# Patient Record
Sex: FEMALE | Race: WHITE | Employment: PART TIME | URBAN - METROPOLITAN AREA
[De-identification: names, ages, dates, MRNs, and addresses within clinical notes are randomized per-mention and may not be internally consistent; named-entity substitution may affect disease eponyms.]

---

## 2017-01-25 ENCOUNTER — OFFICE VISIT (OUTPATIENT)
Dept: FAMILY MEDICINE CLINIC | Facility: CLINIC | Age: 57
End: 2017-01-25

## 2017-01-25 VITALS
WEIGHT: 244 LBS | HEART RATE: 83 BPM | DIASTOLIC BLOOD PRESSURE: 74 MMHG | HEIGHT: 67 IN | RESPIRATION RATE: 16 BRPM | TEMPERATURE: 98 F | SYSTOLIC BLOOD PRESSURE: 132 MMHG | BODY MASS INDEX: 38.3 KG/M2 | OXYGEN SATURATION: 98 %

## 2017-01-25 DIAGNOSIS — Z01.419 WELL WOMAN EXAM WITH ROUTINE GYNECOLOGICAL EXAM: Primary | ICD-10-CM

## 2017-01-25 DIAGNOSIS — Z12.4 SCREENING FOR MALIGNANT NEOPLASM OF CERVIX: ICD-10-CM

## 2017-01-25 DIAGNOSIS — Z12.39 SCREENING FOR BREAST CANCER: ICD-10-CM

## 2017-01-25 DIAGNOSIS — Z12.11 SCREENING FOR COLON CANCER: ICD-10-CM

## 2017-01-25 DIAGNOSIS — I10 ESSENTIAL HYPERTENSION: ICD-10-CM

## 2017-01-25 DIAGNOSIS — E55.9 VITAMIN D DEFICIENCY: ICD-10-CM

## 2017-01-25 DIAGNOSIS — E78.2 MIXED HYPERLIPIDEMIA: ICD-10-CM

## 2017-01-25 PROCEDURE — 88175 CYTOPATH C/V AUTO FLUID REDO: CPT | Performed by: PHYSICIAN ASSISTANT

## 2017-01-25 PROCEDURE — 99396 PREV VISIT EST AGE 40-64: CPT | Performed by: PHYSICIAN ASSISTANT

## 2017-01-25 PROCEDURE — 87624 HPV HI-RISK TYP POOLED RSLT: CPT | Performed by: PHYSICIAN ASSISTANT

## 2017-01-25 NOTE — PROGRESS NOTES
HPI:   Jake Gao is a 61year old female who presents for a well woman exam. Symptoms: denies discharge, itching, burning or dysuria, is menopausal. Has form to be completed for work. No LMP recorded.  Patient is postmenopausal.  Previous pap: pt unsu Use: Yes           1.5 oz/week       3 Standard drinks or equivalent per week       Comment: social    Occ: [employer] . : yes. Children: yes. Exercise: 4 times per week.   Diet: watches calories closely     REVIEW OF SYSTEMS:   GENE for a well woman exam.  Pap and pelvic done. Discussed the benefits of routine exercise, a heart healthy diet, adequate dietary calcium, and annual flu vaccines.   Reinforced monthly self breast self-exams with annual mammograms and screening colonoscopy st

## 2017-01-27 LAB — HPV I/H RISK 1 DNA SPEC QL NAA+PROBE: NEGATIVE

## 2017-05-15 ENCOUNTER — OFFICE VISIT (OUTPATIENT)
Dept: FAMILY MEDICINE CLINIC | Facility: CLINIC | Age: 57
End: 2017-05-15

## 2017-05-15 VITALS
HEART RATE: 89 BPM | WEIGHT: 244 LBS | OXYGEN SATURATION: 98 % | DIASTOLIC BLOOD PRESSURE: 80 MMHG | HEIGHT: 67 IN | BODY MASS INDEX: 38.3 KG/M2 | SYSTOLIC BLOOD PRESSURE: 124 MMHG | RESPIRATION RATE: 18 BRPM | TEMPERATURE: 100 F

## 2017-05-15 DIAGNOSIS — J02.0 STREP THROAT: Primary | ICD-10-CM

## 2017-05-15 DIAGNOSIS — J02.9 SORE THROAT: ICD-10-CM

## 2017-05-15 PROCEDURE — 87880 STREP A ASSAY W/OPTIC: CPT | Performed by: NURSE PRACTITIONER

## 2017-05-15 PROCEDURE — 99213 OFFICE O/P EST LOW 20 MIN: CPT | Performed by: NURSE PRACTITIONER

## 2017-05-15 RX ORDER — AMOXICILLIN 500 MG/1
500 TABLET, FILM COATED ORAL 2 TIMES DAILY
Qty: 20 TABLET | Refills: 0 | Status: SHIPPED | OUTPATIENT
Start: 2017-05-15 | End: 2017-05-25

## 2017-05-15 NOTE — PROGRESS NOTES
CHIEF COMPLAINT:   Patient presents with:  Sore Throat: fever x 1 day       HPI:   Francisco Montgomery is a 61year old female presents to clinic with symptoms of sore throat. Patient has had for 1 days. Symptoms have been worsneing since onset.   Patient reports NOSE: nostrils patent, no exudates, nasal mucosa pink and noninflamed  THROAT: oral mucosa pink, moist. Posterior pharynx erythematous and injected. pos exudates. Tonsils 2/4. Breath pos malodorous. No uvular deviation. No drooling.   NECK: supple  LUNGS: You have had a positive test for strep throat. Strep throat is a contagious illness. It is spread by coughing, kissing or by touching others after touching your mouth or nose.  Symptoms include throat pain which is worse with swallowing, aching all over, he · Inability to swallow liquids, excessive drooling, or inability to open mouth wide due to throat pain  · Signs of dehydration (very dark urine or no urine, sunken eyes, dizziness)  · Trouble breathing or noisy breathing  · Muffled voice  · New rash  Date

## 2017-06-10 ENCOUNTER — TELEPHONE (OUTPATIENT)
Dept: FAMILY MEDICINE CLINIC | Facility: CLINIC | Age: 57
End: 2017-06-10

## 2017-06-10 DIAGNOSIS — I10 ESSENTIAL HYPERTENSION WITH GOAL BLOOD PRESSURE LESS THAN 140/90: Primary | ICD-10-CM

## 2017-06-12 RX ORDER — LISINOPRIL AND HYDROCHLOROTHIAZIDE 12.5; 1 MG/1; MG/1
TABLET ORAL
Qty: 90 TABLET | Refills: 2 | Status: SHIPPED | OUTPATIENT
Start: 2017-06-12 | End: 2017-11-15

## 2017-07-19 ENCOUNTER — NURSE ONLY (OUTPATIENT)
Dept: FAMILY MEDICINE CLINIC | Facility: CLINIC | Age: 57
End: 2017-07-19

## 2017-07-19 VITALS
WEIGHT: 244 LBS | HEIGHT: 69 IN | TEMPERATURE: 98 F | OXYGEN SATURATION: 98 % | SYSTOLIC BLOOD PRESSURE: 120 MMHG | DIASTOLIC BLOOD PRESSURE: 70 MMHG | RESPIRATION RATE: 20 BRPM | HEART RATE: 90 BPM | BODY MASS INDEX: 36.14 KG/M2

## 2017-07-19 DIAGNOSIS — K13.79 MOUTH SORE: Primary | ICD-10-CM

## 2017-07-19 PROCEDURE — 99212 OFFICE O/P EST SF 10 MIN: CPT | Performed by: NURSE PRACTITIONER

## 2017-07-19 NOTE — PROGRESS NOTES
CHIEF COMPLAINT:   Patient presents with:  Redness: back of upper mouth near throat s/s for 1 day      HPI:   Julián Franco is a 61year old female presents to clinic with symptoms of slight throat irritation  Reports yesterday thought she felt a bit of a s non-injected, no bulging, retraction, or fluid  NOSE: nostrils patent, no exudates, nasal mucosa pink and noninflamed  THROAT: oral mucosa pink, moist. Edge of soft palate area with few erythematous spots. No ulcerations, no swelling.   No gum erythema or

## 2017-11-14 ENCOUNTER — APPOINTMENT (OUTPATIENT)
Dept: LAB | Age: 57
End: 2017-11-14
Attending: PHYSICIAN ASSISTANT
Payer: COMMERCIAL

## 2017-11-14 DIAGNOSIS — E55.9 VITAMIN D DEFICIENCY: ICD-10-CM

## 2017-11-14 DIAGNOSIS — E78.2 MIXED HYPERLIPIDEMIA: ICD-10-CM

## 2017-11-14 DIAGNOSIS — I10 ESSENTIAL HYPERTENSION: ICD-10-CM

## 2017-11-14 PROCEDURE — 80061 LIPID PANEL: CPT | Performed by: PHYSICIAN ASSISTANT

## 2017-11-14 PROCEDURE — 80053 COMPREHEN METABOLIC PANEL: CPT | Performed by: PHYSICIAN ASSISTANT

## 2017-11-14 PROCEDURE — 82306 VITAMIN D 25 HYDROXY: CPT | Performed by: PHYSICIAN ASSISTANT

## 2017-11-14 PROCEDURE — 36415 COLL VENOUS BLD VENIPUNCTURE: CPT | Performed by: PHYSICIAN ASSISTANT

## 2017-11-15 ENCOUNTER — OFFICE VISIT (OUTPATIENT)
Dept: FAMILY MEDICINE CLINIC | Facility: CLINIC | Age: 57
End: 2017-11-15

## 2017-11-15 VITALS
TEMPERATURE: 98 F | BODY MASS INDEX: 37.46 KG/M2 | WEIGHT: 250 LBS | HEIGHT: 68.5 IN | HEART RATE: 83 BPM | OXYGEN SATURATION: 98 % | SYSTOLIC BLOOD PRESSURE: 138 MMHG | DIASTOLIC BLOOD PRESSURE: 82 MMHG | RESPIRATION RATE: 20 BRPM

## 2017-11-15 DIAGNOSIS — E66.9 OBESITY (BMI 35.0-39.9 WITHOUT COMORBIDITY): ICD-10-CM

## 2017-11-15 DIAGNOSIS — I10 ESSENTIAL HYPERTENSION WITH GOAL BLOOD PRESSURE LESS THAN 140/90: Primary | ICD-10-CM

## 2017-11-15 DIAGNOSIS — E55.9 VITAMIN D DEFICIENCY: ICD-10-CM

## 2017-11-15 DIAGNOSIS — E78.2 MIXED HYPERLIPIDEMIA: ICD-10-CM

## 2017-11-15 PROCEDURE — 99214 OFFICE O/P EST MOD 30 MIN: CPT | Performed by: PHYSICIAN ASSISTANT

## 2017-11-15 RX ORDER — LISINOPRIL AND HYDROCHLOROTHIAZIDE 12.5; 1 MG/1; MG/1
TABLET ORAL
Qty: 90 TABLET | Refills: 3 | Status: SHIPPED | OUTPATIENT
Start: 2017-11-15 | End: 2018-11-21

## 2017-11-15 NOTE — PATIENT INSTRUCTIONS
Vitamin D3 4000 units daily. Fish oil 2000 mg daily.    [facility] [phone]

## 2017-11-15 NOTE — PROGRESS NOTES
HPI:    Patient ID: Julián Franco is a 61year old female. HPI  Pt presents to clinic to follow up on blood pressure. Takes lisinopril-HCTZ daily for control. Pt took her medication just prior to her visit this morning.    Checking BP at home: 110/80 duri salt and heart healthy diet  Continue home monitoring  Regular aerobic exercise  CMP ordered. - COMP METABOLIC PANEL (14); Future  - Lisinopril-Hydrochlorothiazide 10-12.5 MG Oral Tab; TAKE ONE TABLET BY MOUTH ONCE DAILY  Dispense: 90 tablet;  Refill: 3

## 2017-12-24 ENCOUNTER — OFFICE VISIT (OUTPATIENT)
Dept: FAMILY MEDICINE CLINIC | Facility: CLINIC | Age: 57
End: 2017-12-24

## 2017-12-24 VITALS
OXYGEN SATURATION: 98 % | DIASTOLIC BLOOD PRESSURE: 80 MMHG | HEART RATE: 86 BPM | HEIGHT: 68 IN | WEIGHT: 225 LBS | BODY MASS INDEX: 34.1 KG/M2 | SYSTOLIC BLOOD PRESSURE: 122 MMHG | TEMPERATURE: 98 F | RESPIRATION RATE: 16 BRPM

## 2017-12-24 DIAGNOSIS — N30.01 ACUTE CYSTITIS WITH HEMATURIA: Primary | ICD-10-CM

## 2017-12-24 DIAGNOSIS — R35.0 FREQUENCY OF URINATION: ICD-10-CM

## 2017-12-24 PROCEDURE — 87086 URINE CULTURE/COLONY COUNT: CPT | Performed by: NURSE PRACTITIONER

## 2017-12-24 PROCEDURE — 81003 URINALYSIS AUTO W/O SCOPE: CPT | Performed by: NURSE PRACTITIONER

## 2017-12-24 PROCEDURE — 99213 OFFICE O/P EST LOW 20 MIN: CPT | Performed by: NURSE PRACTITIONER

## 2017-12-24 RX ORDER — NITROFURANTOIN 25; 75 MG/1; MG/1
100 CAPSULE ORAL 2 TIMES DAILY
Qty: 10 CAPSULE | Refills: 0 | Status: SHIPPED | OUTPATIENT
Start: 2017-12-24 | End: 2017-12-29

## 2017-12-24 NOTE — PROGRESS NOTES
Unknown  is a 61year old female. HPI:   Patient presents with symptoms of UTI for 1 days. Complaining of urinary frequency, urgency,   Denies back pain, fever, hematuria.   Pt has no history of UTI in past.  Pt denies any new sexual partner in the p GI: normoactive BS x4, no masses, HSM; no suprapubic tenderness, no CVAT    RESULTS:        Recent Results (from the past 24 hour(s))  -URINALYSIS, AUTO, W/O SCOPE   Collection Time: 12/24/17  9:29 AM   Result Value Ref Range   GLUCOSE (URINE DIPSTICK) neg The phrases \"bladder infection,\" \"UTI,\" and \"cystitis\" are often used to describe the same thing. But they are not always the same. Cystitis is an inflammation of the bladder. The most common cause of cystitis is an infection.   Symptoms  The infectio · Take antibiotics until they are used up, even if you feel better. It is important to finish them to make sure the infection has cleared. · You can use acetaminophen or ibuprofen for pain, fever, or discomfort, unless another medicine was prescribed.  If If X-rays were done, you will be told if the results will affect your treatment.   Call 911  Call 911 if any of the following occur:  · Trouble breathing  · Hard to wake up or confusion  · Fainting or loss of consciousness  · Rapid heart rate  When to seek

## 2017-12-26 NOTE — PROGRESS NOTES
Called patient with negative results. Pt is feeling better and will continue monitoring sx. Will return to clinic if sx increase or persist.   Pt voiced understanding.

## 2018-05-09 ENCOUNTER — OFFICE VISIT (OUTPATIENT)
Dept: FAMILY MEDICINE CLINIC | Facility: CLINIC | Age: 58
End: 2018-05-09

## 2018-05-09 VITALS
OXYGEN SATURATION: 98 % | RESPIRATION RATE: 16 BRPM | SYSTOLIC BLOOD PRESSURE: 132 MMHG | BODY MASS INDEX: 32.58 KG/M2 | HEIGHT: 68 IN | WEIGHT: 215 LBS | DIASTOLIC BLOOD PRESSURE: 74 MMHG | HEART RATE: 78 BPM | TEMPERATURE: 98 F

## 2018-05-09 DIAGNOSIS — J02.9 SORE THROAT: Primary | ICD-10-CM

## 2018-05-09 DIAGNOSIS — J02.9 ACUTE VIRAL PHARYNGITIS: ICD-10-CM

## 2018-05-09 PROCEDURE — 99213 OFFICE O/P EST LOW 20 MIN: CPT | Performed by: NURSE PRACTITIONER

## 2018-05-09 PROCEDURE — 87880 STREP A ASSAY W/OPTIC: CPT | Performed by: NURSE PRACTITIONER

## 2018-05-09 NOTE — PATIENT INSTRUCTIONS
When You Have a Sore Throat    A sore throat can be painful. There are many reasons why you may have a sore throat. Your healthcare provider will work with you to find the cause of your sore throat. He or she will also find the best treatment for you.   Frederick Herrera During the exam, your healthcare provider checks your ears, nose, and throat for problems.  He or she also checks for swelling in the neck, and may listen to your chest.  Possible tests  Other tests your healthcare provider may perform include:  · A throat If your sore throat is due to a bacterial infection, antibiotics may speed healing and prevent complications.  Although group A streptococcus (\"strep throat\" or GAS) is the major treatable infection for a sore throat, GAS causes only 5% to 15% of sore thr © 3581-4426 [employer] [address] [facility], [address] [place]. All rights reserved. This information is not intended as a substitute for professional medical care. Always follow your healthcare professional's instructions.         Self-Ca · Limit contact with pets and with allergy-causing substances, such as pollen and mold. · When you’re around someone with a sore throat or cold, wash your hands often to keep viruses or bacteria from spreading. · Don’t strain your vocal cords.   Call your

## 2018-05-09 NOTE — PROGRESS NOTES
CHIEF COMPLAINT:   Patient presents with:  Sore Throat: sore throat x3 days    HPI:   Kalpana Santana is a 61year old female who presents to clinic with symptoms of sore throat. Patient has had for 3 days. Symptoms have been better since onset.   Patient repo /74   Pulse 78   Temp 97.9 °F (36.6 °C) (Oral)   Resp 16   Ht 68\"   Wt 215 lb   SpO2 98%   BMI 32.69 kg/m²   GENERAL: well developed, well nourished,in no apparent distress  SKIN: no rashes,no suspicious lesions  HEAD: atraumatic, normocephalic  EYE OTC Tylenol/Motrin prn. Push fluids- warm or cool liquids, whichever is soothing for patient  Warm salt water gargles 2 times per day for at least 3 days. Do not share utensils or drinks with anyone.     The patient indicates understanding of these iss · Do you have any other symptoms, such as body aches, fever, or cough? · Does your sore throat recur? If so, how often? How many days of school or work have you missed because of a sore throat? · Do you have trouble eating or swallowing?   · Have you been If your sore throat is due to a bacterial infection, antibiotics may speed healing and prevent complications.  Although group A streptococcus (\"strep throat\" or GAS) is the major treatable infection for a sore throat, GAS causes only 5% to 15% of sore thr © 9000-8020 [address]. [address] [facility], [address] [place]. All rights reserved. This information is not intended as a substitute for professional medical care. Always follow your healthcare professional's instructions.         Self-Ca · Limit contact with pets and with allergy-causing substances, such as pollen and mold. · When you’re around someone with a sore throat or cold, wash your hands often to keep viruses or bacteria from spreading. · Don’t strain your vocal cords.   Call your

## 2018-09-11 ENCOUNTER — OFFICE VISIT (OUTPATIENT)
Dept: FAMILY MEDICINE CLINIC | Facility: CLINIC | Age: 58
End: 2018-09-11
Payer: COMMERCIAL

## 2018-09-11 VITALS
HEART RATE: 83 BPM | BODY MASS INDEX: 32.13 KG/M2 | OXYGEN SATURATION: 98 % | DIASTOLIC BLOOD PRESSURE: 80 MMHG | WEIGHT: 212 LBS | SYSTOLIC BLOOD PRESSURE: 122 MMHG | HEIGHT: 68 IN | RESPIRATION RATE: 16 BRPM | TEMPERATURE: 99 F

## 2018-09-11 DIAGNOSIS — S80.211A ABRASION OF RIGHT KNEE, INITIAL ENCOUNTER: Primary | ICD-10-CM

## 2018-09-11 PROCEDURE — 99212 OFFICE O/P EST SF 10 MIN: CPT | Performed by: NURSE PRACTITIONER

## 2018-09-11 NOTE — PROGRESS NOTES
Patient presents with:  Skin: right knee scraped sx x 4 days. HPI:   Wilberto Colvin is a 60year old female presents with concerns of local infection after scraping her right knee on sand/boardwarlk 4 days ago.   There is no erythema, warmth or tenderne Location: Right arm, Patient Position: Sitting, Cuff Size: adult)   Pulse 83   Temp 98.5 °F (36.9 °C) (Oral)   Resp 16   Ht 68\"   Wt 212 lb   SpO2 98%   BMI 32.23 kg/m²   GENERAL: well developed, well nourished,in no apparent distress  SKIN: right knee wi

## 2018-09-11 NOTE — PATIENT INSTRUCTIONS
Abrasions  Abrasions are skin scrapes. Their treatment depends on how large and deep the abrasion is. Home care  You may be prescribed an antibiotic cream or ointment to apply to the wound. This helps prevent infection.  Follow instructions when using th · Increasing pain, redness, swelling, or drainage from the wound  · Bleeding from the wound that does not stop after a few minutes of steady, firm pressure  · Decreased ability to move any body part near the wound  Date Last Reviewed: 3/3/2017  © 9234-2688

## 2018-10-04 DIAGNOSIS — Z12.31 ENCOUNTER FOR SCREENING MAMMOGRAM FOR MALIGNANT NEOPLASM OF BREAST: Primary | ICD-10-CM

## 2018-11-09 ENCOUNTER — LAB SERVICES (OUTPATIENT)
Dept: OTHER | Age: 58
End: 2018-11-09

## 2018-11-09 ENCOUNTER — CHARTING TRANS (OUTPATIENT)
Dept: OTHER | Age: 58
End: 2018-11-09

## 2018-11-09 LAB — RAPID STREP GROUP A: NORMAL

## 2018-11-11 ENCOUNTER — LAB SERVICES (OUTPATIENT)
Dept: OTHER | Age: 58
End: 2018-11-11

## 2018-11-11 ENCOUNTER — CHARTING TRANS (OUTPATIENT)
Dept: OTHER | Age: 58
End: 2018-11-11

## 2018-11-11 LAB — RAPID STREP GROUP A: NORMAL

## 2018-11-11 ASSESSMENT — PAIN SCALES - GENERAL: PAINLEVEL_OUTOF10: 4

## 2018-11-21 DIAGNOSIS — I10 ESSENTIAL HYPERTENSION WITH GOAL BLOOD PRESSURE LESS THAN 140/90: ICD-10-CM

## 2018-11-21 RX ORDER — LISINOPRIL AND HYDROCHLOROTHIAZIDE 12.5; 1 MG/1; MG/1
TABLET ORAL
Qty: 30 TABLET | Refills: 0 | Status: SHIPPED | OUTPATIENT
Start: 2018-11-21 | End: 2018-12-19

## 2018-11-21 RX ORDER — LISINOPRIL AND HYDROCHLOROTHIAZIDE 12.5; 1 MG/1; MG/1
TABLET ORAL
Qty: 90 TABLET | Refills: 0 | OUTPATIENT
Start: 2018-11-21

## 2018-11-23 DIAGNOSIS — I10 ESSENTIAL HYPERTENSION WITH GOAL BLOOD PRESSURE LESS THAN 140/90: ICD-10-CM

## 2018-11-23 RX ORDER — LISINOPRIL AND HYDROCHLOROTHIAZIDE 12.5; 1 MG/1; MG/1
TABLET ORAL
Qty: 90 TABLET | Refills: 3 | OUTPATIENT
Start: 2018-11-23

## 2018-12-08 VITALS
HEIGHT: 67 IN | WEIGHT: 187 LBS | TEMPERATURE: 98.3 F | DIASTOLIC BLOOD PRESSURE: 80 MMHG | RESPIRATION RATE: 16 BRPM | HEART RATE: 64 BPM | BODY MASS INDEX: 29.35 KG/M2 | SYSTOLIC BLOOD PRESSURE: 132 MMHG

## 2018-12-08 VITALS
BODY MASS INDEX: 29.35 KG/M2 | RESPIRATION RATE: 16 BRPM | HEART RATE: 66 BPM | HEIGHT: 67 IN | WEIGHT: 186.99 LBS | DIASTOLIC BLOOD PRESSURE: 88 MMHG | SYSTOLIC BLOOD PRESSURE: 140 MMHG | TEMPERATURE: 99.2 F

## 2018-12-17 ENCOUNTER — TELEPHONE (OUTPATIENT)
Dept: FAMILY MEDICINE CLINIC | Facility: CLINIC | Age: 58
End: 2018-12-17

## 2018-12-17 NOTE — TELEPHONE ENCOUNTER
Please call patient and see if she wants to do her physical on Wednesday. Scheduled for BP follow up. Last physical 1/2017.

## 2018-12-17 NOTE — TELEPHONE ENCOUNTER
LVM FOR PATIENT TO CALL AND LET US KNOW IF SHE WOULD LIKE TO DO HER PX THE SAME TIME AS HER BP CHECK PER PADMINI.

## 2018-12-18 ENCOUNTER — TELEPHONE (OUTPATIENT)
Dept: FAMILY MEDICINE CLINIC | Facility: CLINIC | Age: 58
End: 2018-12-18

## 2018-12-19 ENCOUNTER — OFFICE VISIT (OUTPATIENT)
Dept: FAMILY MEDICINE CLINIC | Facility: CLINIC | Age: 58
End: 2018-12-19
Payer: COMMERCIAL

## 2018-12-19 VITALS
RESPIRATION RATE: 16 BRPM | TEMPERATURE: 98 F | DIASTOLIC BLOOD PRESSURE: 84 MMHG | SYSTOLIC BLOOD PRESSURE: 126 MMHG | HEIGHT: 68 IN | WEIGHT: 222 LBS | BODY MASS INDEX: 33.65 KG/M2 | HEART RATE: 70 BPM

## 2018-12-19 DIAGNOSIS — Z00.00 ROUTINE MEDICAL EXAM: ICD-10-CM

## 2018-12-19 DIAGNOSIS — I10 ESSENTIAL HYPERTENSION WITH GOAL BLOOD PRESSURE LESS THAN 140/90: Primary | ICD-10-CM

## 2018-12-19 PROCEDURE — 99213 OFFICE O/P EST LOW 20 MIN: CPT | Performed by: PHYSICIAN ASSISTANT

## 2018-12-19 RX ORDER — LISINOPRIL AND HYDROCHLOROTHIAZIDE 12.5; 1 MG/1; MG/1
1 TABLET ORAL DAILY
Qty: 90 TABLET | Refills: 1 | Status: SHIPPED | OUTPATIENT
Start: 2018-12-19 | End: 2019-03-01

## 2018-12-19 RX ORDER — CHLORAL HYDRATE 500 MG
CAPSULE ORAL
COMMUNITY

## 2018-12-19 NOTE — PROGRESS NOTES
HPI:   Dinora Cai is a 60year old female who presents for follow up HTN. On lisinopril-HCTZ for control. Checks BP at home once/week and sometimes when she is out near a pharmacy. States the readings are 110/80.  Typically higher when she comes to th swelling, mass, or lump    • Normal delivery 96   • Streptococcal sore throat    • Undiagnosed cardiac murmurs       Past Surgical History:   Procedure Laterality Date   •      • TONSILLECTOMY     • TUBAL LIGATION  02      Family Hi blood pressure less than 140/90  Bp controlled  Continue current medication  Labs ordered  Low salt, heart healthy diet  Continue regular aerobic exercise  Continue home BP monitoring.   - Lisinopril-Hydrochlorothiazide 10-12.5 MG Oral Tab;  Take 1 tablet b

## 2018-12-29 ENCOUNTER — OFFICE VISIT (OUTPATIENT)
Dept: FAMILY MEDICINE CLINIC | Facility: CLINIC | Age: 58
End: 2018-12-29
Payer: COMMERCIAL

## 2018-12-29 VITALS
OXYGEN SATURATION: 98 % | SYSTOLIC BLOOD PRESSURE: 124 MMHG | DIASTOLIC BLOOD PRESSURE: 84 MMHG | RESPIRATION RATE: 16 BRPM | BODY MASS INDEX: 34 KG/M2 | HEART RATE: 80 BPM | TEMPERATURE: 99 F | WEIGHT: 225.81 LBS

## 2018-12-29 DIAGNOSIS — R35.0 FREQUENCY OF URINATION: Primary | ICD-10-CM

## 2018-12-29 DIAGNOSIS — N30.01 ACUTE CYSTITIS WITH HEMATURIA: ICD-10-CM

## 2018-12-29 PROCEDURE — 81003 URINALYSIS AUTO W/O SCOPE: CPT | Performed by: PHYSICIAN ASSISTANT

## 2018-12-29 PROCEDURE — 99213 OFFICE O/P EST LOW 20 MIN: CPT | Performed by: PHYSICIAN ASSISTANT

## 2018-12-29 PROCEDURE — 87086 URINE CULTURE/COLONY COUNT: CPT | Performed by: PHYSICIAN ASSISTANT

## 2018-12-29 RX ORDER — NITROFURANTOIN 25; 75 MG/1; MG/1
100 CAPSULE ORAL 2 TIMES DAILY
Qty: 14 CAPSULE | Refills: 0 | Status: SHIPPED | OUTPATIENT
Start: 2018-12-29 | End: 2019-01-05

## 2018-12-29 NOTE — PATIENT INSTRUCTIONS
-Push fluids- gatorade, water, cranberry juice.  -Will call in 1-3 days with urine culture results  -If have increased urinary urgency, urinary frequency, blood in urine, fevers, chills, sweats, back pain, or abdominal pain, please seek medical care immedi urine  · Abdominal discomfort. This is usually in the lower abdomen above the pubic bone.   · Cloudy urine  · Strong- or bad-smelling urine  · Unable to urinate (urinary retention)  · Unable to hold urine in (urinary incontinence)  · Fever  · Loss of appeti clothing. Care and prevention  These self-care steps can help prevent future infections:  · Drink plenty of fluids to prevent dehydration and flush out your bladder.  Do this unless you must restrict fluids for other health reasons, or your doctor told you (labia)  Date Last Reviewed: 10/1/2016  © 7936-0183 [employer] [address]. [address] [place], [address] [place]. All rights reserved. This information is not intended as a substitute for professional medical care.  Always follow your healthcare pro

## 2018-12-29 NOTE — PROGRESS NOTES
CHIEF COMPLAINT:   Patient presents with:  UTI: frequency urination, bruning started today      HPI:   Darshana Wilson is a 60year old female who presents with symptoms of UTI. Complaining of urinary frequency, urgency, dysuria for last 1 days.  Symptoms ha /84 (BP Location: Right arm, Patient Position: Sitting, Cuff Size: large)   Pulse 80   Temp 98.7 °F (37.1 °C) (Oral)   Resp 16   Wt 225 lb 12.8 oz   SpO2 98%   BMI 34.33 kg/m²   GENERAL: well developed, well nourished,in no apparent distress  CARDIO: -If have increased urinary urgency, urinary frequency, blood in urine, fevers, chills, sweats, back pain, or abdominal pain, please seek medical care immediately. What can you do to help prevent bladder infections? Urinate often during the day.  You sh · Strong- or bad-smelling urine  · Unable to urinate (urinary retention)  · Unable to hold urine in (urinary incontinence)  · Fever  · Loss of appetite  · Confusion (in older adults)  Causes  Bladder infections are not contagious.  You can't get one from so · Drink plenty of fluids to prevent dehydration and flush out your bladder. Do this unless you must restrict fluids for other health reasons, or your doctor told you not to. · Proper cleaning after going to the bathroom is important.  Wipe from front to ba © 5031-7229 [employer] [address]. [address] [facility], [address] [place]. All rights reserved. This information is not intended as a substitute for professional medical care. Always follow your healthcare professional's instructions.               Heather Rodriguez

## 2019-01-19 ENCOUNTER — OFFICE VISIT (OUTPATIENT)
Dept: FAMILY MEDICINE CLINIC | Facility: CLINIC | Age: 59
End: 2019-01-19
Payer: COMMERCIAL

## 2019-01-19 VITALS
RESPIRATION RATE: 16 BRPM | SYSTOLIC BLOOD PRESSURE: 120 MMHG | DIASTOLIC BLOOD PRESSURE: 70 MMHG | BODY MASS INDEX: 34.71 KG/M2 | OXYGEN SATURATION: 99 % | HEART RATE: 77 BPM | WEIGHT: 229 LBS | HEIGHT: 68 IN | TEMPERATURE: 98 F

## 2019-01-19 DIAGNOSIS — R39.9 UTI SYMPTOMS: Primary | ICD-10-CM

## 2019-01-19 LAB
MULTISTIX LOT#: ABNORMAL NUMERIC
PH, URINE: 7.5 (ref 4.5–8)
PROTEIN (URINE DIPSTICK): 30 MG/DL
SPECIFIC GRAVITY: 1.02 (ref 1–1.03)
URINE-COLOR: YELLOW
UROBILINOGEN,SEMI-QN: 0.2 MG/DL (ref 0–1.9)

## 2019-01-19 PROCEDURE — 87086 URINE CULTURE/COLONY COUNT: CPT | Performed by: NURSE PRACTITIONER

## 2019-01-19 PROCEDURE — 81003 URINALYSIS AUTO W/O SCOPE: CPT | Performed by: NURSE PRACTITIONER

## 2019-01-19 PROCEDURE — 99213 OFFICE O/P EST LOW 20 MIN: CPT | Performed by: NURSE PRACTITIONER

## 2019-01-19 RX ORDER — SULFAMETHOXAZOLE AND TRIMETHOPRIM 800; 160 MG/1; MG/1
1 TABLET ORAL 2 TIMES DAILY
Qty: 14 TABLET | Refills: 0 | Status: SHIPPED | OUTPATIENT
Start: 2019-01-19 | End: 2019-01-26

## 2019-01-19 NOTE — PROGRESS NOTES
CHIEF COMPLAINT:   Patient presents with:  UTI      HPI:   Latonia Mcdaniel is a 60year old female who presents with symptoms of UTI. Complaining of urinary urgency starting this morning .  Reports just had UTI about 3 weeks ago and symptoms feel similar to GI: See HPI. No N/V/C/D. : See HPI. NEURO: no headaches.     EXAM:   /70 (BP Location: Right arm, Patient Position: Sitting, Cuff Size: large)   Pulse 77   Temp 97.5 °F (36.4 °C) (Oral)   Resp 16   Ht 68\"   Wt 229 lb   SpO2 99%   BMI 34.82 kg/m² Signed Prescriptions Disp Refills   • Sulfamethoxazole-TMP -160 MG Oral Tab per tablet 14 tablet 0     Sig: Take 1 tablet by mouth 2 (two) times daily for 7 days. Risk and benefits of medication discussed.  Stressed importance of completing full Treatment may involve a combination of medicine, lifestyle changes, surgery and other methods. There is no single known effective treatment. It may take some time to find the right combination of treatments for you.   Medicine options include:  · Pain medic © 6776-0460 [address]. [address] [facility] [place], [address] [place]. All rights reserved. This information is not intended as a substitute for professional medical care. Always follow your healthcare professional's instructions.         Jonathan Christine The patient is asked to return in 3 days if not better. Call if fever, vomiting, worsening symptoms. Go to ED if back/flank pain with fever and vomiting.

## 2019-01-19 NOTE — PATIENT INSTRUCTIONS
What is Interstitial Cystitis? Cross section of bladder showing normal lining. Interstitial cystitis is a painful condition of the bladder. It causes the bladder wall to be tender and easily irritated. This leads to uncomfortable symptoms.  Inters · Pain medicine. These may be used for a short time to help ease discomfort. · Antispasmodic medicines. These may help relax the bladder muscles. This may decrease the need to urinate. · Nonsteroidal anti-inflammatory drugs (NSAIDs).  These may help reduc Understanding Urinary Tract Infections (UTIs)  Most UTIs are caused by bacteria, although they may also be caused by viruses or fungi.  Bacteria from the bowel are the most common source of infection. The infection may start because of any of the following:

## 2019-02-28 NOTE — PROGRESS NOTES
HPI:   Jaci Workman is a 60year old female who presents for a complete physical exam. Symptoms: denies discharge, itching, burning or dysuria, is menopausal. Patient complains of needing physical.     Also here for follow up HTN. On lisinopril-HCTZ.  Antonella Herrera December but have not been completed        Current Outpatient Medications:  Omega-3 1000 MG Oral Cap Take by mouth. Disp:  Rfl:    Lisinopril-Hydrochlorothiazide 10-12.5 MG Oral Tab Take 1 tablet by mouth daily.  Disp: 90 tablet Rfl: 1   Cholecalciferol (V 68\"   Wt 227 lb   SpO2 98%   BMI 34.52 kg/m²   Body mass index is 34.52 kg/m².    GENERAL: alert and oriented X 3, well developed, well nourished,in no apparent distress  CARDIO: RRR without murmur, no edema b/l LE, distal pulses 2+ bilaterally  LUNGS: ravi ordered  Calcium, vitamin D and weight bearing exercise  - XR DEXA BONE DENSITOMETRY (CPT=77080); Future    6. Tinea corporis  Ketoconazole daily for 4 weeks, use for 1 week after rash resolves.    Follow up with derm if symptoms persist.  - ketoconazole 2

## 2019-03-01 ENCOUNTER — LAB ENCOUNTER (OUTPATIENT)
Dept: LAB | Age: 59
End: 2019-03-01
Attending: PHYSICIAN ASSISTANT
Payer: COMMERCIAL

## 2019-03-01 ENCOUNTER — OFFICE VISIT (OUTPATIENT)
Dept: FAMILY MEDICINE CLINIC | Facility: CLINIC | Age: 59
End: 2019-03-01
Payer: COMMERCIAL

## 2019-03-01 VITALS
WEIGHT: 227 LBS | OXYGEN SATURATION: 98 % | RESPIRATION RATE: 16 BRPM | HEART RATE: 89 BPM | HEIGHT: 68 IN | DIASTOLIC BLOOD PRESSURE: 80 MMHG | BODY MASS INDEX: 34.4 KG/M2 | TEMPERATURE: 99 F | SYSTOLIC BLOOD PRESSURE: 126 MMHG

## 2019-03-01 DIAGNOSIS — Z00.00 ROUTINE MEDICAL EXAM: ICD-10-CM

## 2019-03-01 DIAGNOSIS — I10 ESSENTIAL HYPERTENSION WITH GOAL BLOOD PRESSURE LESS THAN 140/90: ICD-10-CM

## 2019-03-01 DIAGNOSIS — Z12.11 SCREENING FOR COLON CANCER: ICD-10-CM

## 2019-03-01 DIAGNOSIS — Z12.31 VISIT FOR SCREENING MAMMOGRAM: ICD-10-CM

## 2019-03-01 DIAGNOSIS — Z00.00 ROUTINE MEDICAL EXAM: Primary | ICD-10-CM

## 2019-03-01 DIAGNOSIS — B35.4 TINEA CORPORIS: ICD-10-CM

## 2019-03-01 DIAGNOSIS — Z13.820 SCREENING FOR OSTEOPOROSIS: ICD-10-CM

## 2019-03-01 LAB
ALBUMIN SERPL-MCNC: 4.1 G/DL (ref 3.4–5)
ALBUMIN/GLOB SERPL: 1 {RATIO} (ref 1–2)
ALP LIVER SERPL-CCNC: 67 U/L (ref 46–118)
ALT SERPL-CCNC: 24 U/L (ref 13–56)
ANION GAP SERPL CALC-SCNC: 6 MMOL/L (ref 0–18)
AST SERPL-CCNC: 19 U/L (ref 15–37)
BASOPHILS # BLD AUTO: 0.05 X10(3) UL (ref 0–0.2)
BASOPHILS NFR BLD AUTO: 1 %
BILIRUB SERPL-MCNC: 0.7 MG/DL (ref 0.1–2)
BUN BLD-MCNC: 14 MG/DL (ref 7–18)
BUN/CREAT SERPL: 17.3 (ref 10–20)
CALCIUM BLD-MCNC: 8.8 MG/DL (ref 8.5–10.1)
CHLORIDE SERPL-SCNC: 102 MMOL/L (ref 98–107)
CHOLEST SMN-MCNC: 278 MG/DL (ref ?–200)
CO2 SERPL-SCNC: 29 MMOL/L (ref 21–32)
CREAT BLD-MCNC: 0.81 MG/DL (ref 0.55–1.02)
DEPRECATED RDW RBC AUTO: 43.7 FL (ref 35.1–46.3)
EOSINOPHIL # BLD AUTO: 0.05 X10(3) UL (ref 0–0.7)
EOSINOPHIL NFR BLD AUTO: 1 %
ERYTHROCYTE [DISTWIDTH] IN BLOOD BY AUTOMATED COUNT: 12.4 % (ref 11–15)
GLOBULIN PLAS-MCNC: 4.1 G/DL (ref 2.8–4.4)
GLUCOSE BLD-MCNC: 102 MG/DL (ref 70–99)
HCT VFR BLD AUTO: 41 % (ref 35–48)
HDLC SERPL-MCNC: 82 MG/DL (ref 40–59)
HGB BLD-MCNC: 14 G/DL (ref 12–16)
IMM GRANULOCYTES # BLD AUTO: 0.02 X10(3) UL (ref 0–1)
IMM GRANULOCYTES NFR BLD: 0.4 %
LDLC SERPL CALC-MCNC: 166 MG/DL (ref ?–100)
LYMPHOCYTES # BLD AUTO: 1.76 X10(3) UL (ref 1–4)
LYMPHOCYTES NFR BLD AUTO: 35.9 %
M PROTEIN MFR SERPL ELPH: 8.2 G/DL (ref 6.4–8.2)
MCH RBC QN AUTO: 32.7 PG (ref 26–34)
MCHC RBC AUTO-ENTMCNC: 34.1 G/DL (ref 31–37)
MCV RBC AUTO: 95.8 FL (ref 80–100)
MONOCYTES # BLD AUTO: 0.36 X10(3) UL (ref 0.1–1)
MONOCYTES NFR BLD AUTO: 7.3 %
NEUTROPHILS # BLD AUTO: 2.66 X10 (3) UL (ref 1.5–7.7)
NEUTROPHILS # BLD AUTO: 2.66 X10(3) UL (ref 1.5–7.7)
NEUTROPHILS NFR BLD AUTO: 54.4 %
NONHDLC SERPL-MCNC: 196 MG/DL (ref ?–130)
OSMOLALITY SERPL CALC.SUM OF ELEC: 285 MOSM/KG (ref 275–295)
PLATELET # BLD AUTO: 302 10(3)UL (ref 150–450)
POTASSIUM SERPL-SCNC: 4.5 MMOL/L (ref 3.5–5.1)
RBC # BLD AUTO: 4.28 X10(6)UL (ref 3.8–5.3)
SODIUM SERPL-SCNC: 137 MMOL/L (ref 136–145)
TRIGL SERPL-MCNC: 151 MG/DL (ref 30–149)
TSI SER-ACNC: 3.41 MIU/ML (ref 0.36–3.74)
VIT B12 SERPL-MCNC: 264 PG/ML (ref 193–986)
VIT D+METAB SERPL-MCNC: 20.2 NG/ML (ref 30–100)
VLDLC SERPL CALC-MCNC: 30 MG/DL (ref 0–30)
WBC # BLD AUTO: 4.9 X10(3) UL (ref 4–11)

## 2019-03-01 PROCEDURE — 80061 LIPID PANEL: CPT | Performed by: PHYSICIAN ASSISTANT

## 2019-03-01 PROCEDURE — 99396 PREV VISIT EST AGE 40-64: CPT | Performed by: PHYSICIAN ASSISTANT

## 2019-03-01 PROCEDURE — 82306 VITAMIN D 25 HYDROXY: CPT | Performed by: PHYSICIAN ASSISTANT

## 2019-03-01 PROCEDURE — 80050 GENERAL HEALTH PANEL: CPT | Performed by: PHYSICIAN ASSISTANT

## 2019-03-01 PROCEDURE — 36415 COLL VENOUS BLD VENIPUNCTURE: CPT | Performed by: PHYSICIAN ASSISTANT

## 2019-03-01 PROCEDURE — 82607 VITAMIN B-12: CPT | Performed by: PHYSICIAN ASSISTANT

## 2019-03-01 RX ORDER — LISINOPRIL AND HYDROCHLOROTHIAZIDE 12.5; 1 MG/1; MG/1
1 TABLET ORAL DAILY
Qty: 90 TABLET | Refills: 1 | Status: SHIPPED | OUTPATIENT
Start: 2019-03-01 | End: 2019-09-04

## 2019-03-01 RX ORDER — KETOCONAZOLE 20 MG/G
1 CREAM TOPICAL DAILY
Qty: 60 G | Refills: 0 | Status: SHIPPED | OUTPATIENT
Start: 2019-03-01

## 2019-06-12 ENCOUNTER — APPOINTMENT (OUTPATIENT)
Dept: LAB | Age: 59
End: 2019-06-12
Attending: FAMILY MEDICINE
Payer: COMMERCIAL

## 2019-06-12 ENCOUNTER — OFFICE VISIT (OUTPATIENT)
Dept: FAMILY MEDICINE CLINIC | Facility: CLINIC | Age: 59
End: 2019-06-12
Payer: COMMERCIAL

## 2019-06-12 VITALS
SYSTOLIC BLOOD PRESSURE: 122 MMHG | HEIGHT: 68 IN | OXYGEN SATURATION: 98 % | WEIGHT: 227 LBS | RESPIRATION RATE: 18 BRPM | TEMPERATURE: 98 F | DIASTOLIC BLOOD PRESSURE: 80 MMHG | HEART RATE: 78 BPM | BODY MASS INDEX: 34.4 KG/M2

## 2019-06-12 DIAGNOSIS — M79.662 PAIN OF LEFT CALF: ICD-10-CM

## 2019-06-12 DIAGNOSIS — M79.662 PAIN OF LEFT CALF: Primary | ICD-10-CM

## 2019-06-12 PROCEDURE — 36415 COLL VENOUS BLD VENIPUNCTURE: CPT | Performed by: FAMILY MEDICINE

## 2019-06-12 PROCEDURE — 99213 OFFICE O/P EST LOW 20 MIN: CPT | Performed by: FAMILY MEDICINE

## 2019-06-12 PROCEDURE — 85378 FIBRIN DEGRADE SEMIQUANT: CPT | Performed by: FAMILY MEDICINE

## 2019-06-12 NOTE — PROGRESS NOTES
HPI:    Patient ID: Kamlesh Colón is a 60year old female. Knee Pain    The pain is present in the left knee and left lower leg. This is a new problem. Episode onset: saturday.  There has been a history of trauma (Pt was at a wedding and sipped and fell on worsen or fail to improve. 1. Pain of left calf  - D-DIMER; Future  Likely muscle pull. Rest, stretch, if not better will do PT.   Orders Placed This Encounter      D-Dimer [E]      Meds This Visit:  Requested Prescriptions      No prescriptions request

## 2019-06-20 ENCOUNTER — WALK IN (OUTPATIENT)
Dept: URGENT CARE | Age: 59
End: 2019-06-20

## 2019-06-20 VITALS
WEIGHT: 210 LBS | HEIGHT: 68 IN | BODY MASS INDEX: 31.83 KG/M2 | HEART RATE: 69 BPM | DIASTOLIC BLOOD PRESSURE: 60 MMHG | TEMPERATURE: 98.1 F | SYSTOLIC BLOOD PRESSURE: 122 MMHG | RESPIRATION RATE: 18 BRPM

## 2019-06-20 DIAGNOSIS — N30.01 ACUTE CYSTITIS WITH HEMATURIA: Primary | ICD-10-CM

## 2019-06-20 LAB
APPEARANCE, POC: CLEAR
BILIRUBIN, POC: NEGATIVE
COLOR, POC: ABNORMAL
GLUCOSE UR-MCNC: NEGATIVE MG/DL
KETONES, POC: NEGATIVE
NITRITE, POC: NEGATIVE
OCCULT BLOOD, POC: ABNORMAL
PH UR: 7 [PH] (ref 5–7)
PROT UR-MCNC: NEGATIVE G/DL
SP GR UR: 1 (ref 1–1.03)
UROBILINOGEN UR-MCNC: 0.2 MG/DL (ref 0–1)
WBC (LEUKOCYTE) ESTERASE, POC: ABNORMAL

## 2019-06-20 PROCEDURE — 99214 OFFICE O/P EST MOD 30 MIN: CPT | Performed by: NURSE PRACTITIONER

## 2019-06-20 PROCEDURE — 81002 URINALYSIS NONAUTO W/O SCOPE: CPT | Performed by: NURSE PRACTITIONER

## 2019-06-20 RX ORDER — CHLORAL HYDRATE 500 MG
CAPSULE ORAL
COMMUNITY

## 2019-06-20 RX ORDER — NITROFURANTOIN 25; 75 MG/1; MG/1
100 CAPSULE ORAL 2 TIMES DAILY
Qty: 10 CAPSULE | Refills: 0 | Status: SHIPPED | OUTPATIENT
Start: 2019-06-20 | End: 2019-06-25

## 2019-06-20 RX ORDER — LISINOPRIL AND HYDROCHLOROTHIAZIDE 12.5; 1 MG/1; MG/1
1 TABLET ORAL
COMMUNITY
Start: 2019-03-01

## 2019-09-04 DIAGNOSIS — I10 ESSENTIAL HYPERTENSION WITH GOAL BLOOD PRESSURE LESS THAN 140/90: ICD-10-CM

## 2019-09-04 RX ORDER — LISINOPRIL AND HYDROCHLOROTHIAZIDE 12.5; 1 MG/1; MG/1
1 TABLET ORAL DAILY
Qty: 90 TABLET | Refills: 0 | Status: SHIPPED | OUTPATIENT
Start: 2019-09-04 | End: 2019-09-19

## 2019-09-04 NOTE — TELEPHONE ENCOUNTER
[facility] pharmacy stated they faxed us a refill request about a week ago for the lisinopril. Please call and advise.

## 2019-09-04 NOTE — TELEPHONE ENCOUNTER
Approve/deny? Last filled 3/1/19 x 6 months at [facility].  Last ov 6/2019 with YP for calf pain, last labwork done 3/1/19

## 2019-09-11 ENCOUNTER — WALK IN (OUTPATIENT)
Dept: URGENT CARE | Age: 59
End: 2019-09-11

## 2019-09-11 VITALS
DIASTOLIC BLOOD PRESSURE: 88 MMHG | TEMPERATURE: 97.8 F | SYSTOLIC BLOOD PRESSURE: 154 MMHG | HEART RATE: 82 BPM | RESPIRATION RATE: 18 BRPM

## 2019-09-11 DIAGNOSIS — N30.00 ACUTE CYSTITIS WITHOUT HEMATURIA: Primary | ICD-10-CM

## 2019-09-11 LAB
APPEARANCE, POC: CLEAR
BILIRUBIN, POC: NEGATIVE
COLOR, POC: YELLOW
GLUCOSE UR-MCNC: NEGATIVE MG/DL
KETONES, POC: NEGATIVE
NITRITE, POC: NEGATIVE
OCCULT BLOOD, POC: NEGATIVE
PH UR: 7 [PH] (ref 5–7)
PROT UR-MCNC: NEGATIVE G/DL
SP GR UR: 1 (ref 1–1.03)
UROBILINOGEN UR-MCNC: 0.2 MG/DL (ref 0–1)
WBC (LEUKOCYTE) ESTERASE, POC: ABNORMAL

## 2019-09-11 PROCEDURE — 99214 OFFICE O/P EST MOD 30 MIN: CPT | Performed by: NURSE PRACTITIONER

## 2019-09-11 PROCEDURE — 87086 URINE CULTURE/COLONY COUNT: CPT | Performed by: NURSE PRACTITIONER

## 2019-09-11 PROCEDURE — 81002 URINALYSIS NONAUTO W/O SCOPE: CPT | Performed by: NURSE PRACTITIONER

## 2019-09-11 RX ORDER — SULFAMETHOXAZOLE AND TRIMETHOPRIM 800; 160 MG/1; MG/1
1 TABLET ORAL 2 TIMES DAILY
Qty: 6 TABLET | Refills: 0 | Status: SHIPPED | OUTPATIENT
Start: 2019-09-11 | End: 2019-09-14

## 2019-09-11 ASSESSMENT — ENCOUNTER SYMPTOMS
CONSTITUTIONAL NEGATIVE: 1
RESPIRATORY NEGATIVE: 1
GASTROINTESTINAL NEGATIVE: 1

## 2019-09-13 LAB
BACTERIA UR CULT: NORMAL
REPORT STATUS (RPT): NORMAL
SPECIMEN SOURCE: NORMAL

## 2019-09-19 ENCOUNTER — OFFICE VISIT (OUTPATIENT)
Dept: FAMILY MEDICINE CLINIC | Facility: CLINIC | Age: 59
End: 2019-09-19
Payer: COMMERCIAL

## 2019-09-19 VITALS
WEIGHT: 228 LBS | TEMPERATURE: 98 F | HEART RATE: 79 BPM | SYSTOLIC BLOOD PRESSURE: 124 MMHG | OXYGEN SATURATION: 97 % | BODY MASS INDEX: 34.56 KG/M2 | HEIGHT: 68 IN | DIASTOLIC BLOOD PRESSURE: 82 MMHG | RESPIRATION RATE: 20 BRPM

## 2019-09-19 DIAGNOSIS — L98.9 SKIN LESION: ICD-10-CM

## 2019-09-19 DIAGNOSIS — Z12.39 BREAST CANCER SCREENING: ICD-10-CM

## 2019-09-19 DIAGNOSIS — Z12.11 COLON CANCER SCREENING: ICD-10-CM

## 2019-09-19 DIAGNOSIS — E78.5 HYPERLIPIDEMIA, UNSPECIFIED HYPERLIPIDEMIA TYPE: ICD-10-CM

## 2019-09-19 DIAGNOSIS — I10 ESSENTIAL HYPERTENSION WITH GOAL BLOOD PRESSURE LESS THAN 140/90: ICD-10-CM

## 2019-09-19 DIAGNOSIS — E55.9 VITAMIN D DEFICIENCY: Primary | ICD-10-CM

## 2019-09-19 DIAGNOSIS — Z78.0 POST-MENOPAUSAL: ICD-10-CM

## 2019-09-19 PROCEDURE — 99214 OFFICE O/P EST MOD 30 MIN: CPT | Performed by: FAMILY MEDICINE

## 2019-09-19 RX ORDER — LISINOPRIL AND HYDROCHLOROTHIAZIDE 12.5; 1 MG/1; MG/1
1 TABLET ORAL DAILY
Qty: 90 TABLET | Refills: 0 | Status: SHIPPED | OUTPATIENT
Start: 2019-12-19 | End: 2020-05-28

## 2019-09-19 NOTE — PROGRESS NOTES
HPI:    Patient ID: Kalpana Santana is a 60year old female. Patient states that she checks her bp regularly at home. States that she has been taking 1 tablet of fish oil daily for approximately one year. Denies regular exercise.      Patient states that she Cholecalciferol (VITAMIN D) 1000 units Oral Tab Take by mouth. Disp:  Rfl:    ketoconazole 2 % External Cream Apply 1 Application topically daily.  Disp: 60 g Rfl: 0     Allergies:No Known Allergies   PHYSICAL EXAM:   Physical Exam   Constitutional: She a Lisinopril-hydroCHLOROthiazide 10-12.5 MG Oral Tab; Take 1 tablet by mouth daily. Dispense: 90 tablet;  Refill: 0    Orders Placed This Encounter      Comp Metabolic Panel (14) [E]      Lipid Panel [E]      Vitamin D, 25-Hydroxy [E]      Meds This Visit:

## 2019-11-16 ENCOUNTER — WALK IN (OUTPATIENT)
Dept: URGENT CARE | Age: 59
End: 2019-11-16

## 2019-11-16 VITALS
DIASTOLIC BLOOD PRESSURE: 82 MMHG | SYSTOLIC BLOOD PRESSURE: 128 MMHG | WEIGHT: 218 LBS | TEMPERATURE: 98.8 F | OXYGEN SATURATION: 99 % | HEIGHT: 68 IN | RESPIRATION RATE: 14 BRPM | HEART RATE: 76 BPM | BODY MASS INDEX: 33.04 KG/M2

## 2019-11-16 DIAGNOSIS — J06.9 UPPER RESPIRATORY TRACT INFECTION, UNSPECIFIED TYPE: Primary | ICD-10-CM

## 2019-11-16 DIAGNOSIS — H65.193 OTHER ACUTE NONSUPPURATIVE OTITIS MEDIA OF BOTH EARS, RECURRENCE NOT SPECIFIED: ICD-10-CM

## 2019-11-16 PROBLEM — E78.2 MIXED HYPERLIPIDEMIA: Status: ACTIVE | Noted: 2017-11-15

## 2019-11-16 PROCEDURE — 3079F DIAST BP 80-89 MM HG: CPT | Performed by: NURSE PRACTITIONER

## 2019-11-16 PROCEDURE — 3074F SYST BP LT 130 MM HG: CPT | Performed by: NURSE PRACTITIONER

## 2019-11-16 PROCEDURE — 99213 OFFICE O/P EST LOW 20 MIN: CPT | Performed by: NURSE PRACTITIONER

## 2019-11-16 RX ORDER — ALBUTEROL SULFATE 90 UG/1
2 AEROSOL, METERED RESPIRATORY (INHALATION) EVERY 4 HOURS PRN
Qty: 1 INHALER | Refills: 0 | Status: SHIPPED | OUTPATIENT
Start: 2019-11-16

## 2019-11-16 RX ORDER — AMOXICILLIN 875 MG/1
875 TABLET, COATED ORAL 2 TIMES DAILY
Qty: 20 TABLET | Refills: 0 | Status: SHIPPED | OUTPATIENT
Start: 2019-11-16 | End: 2019-11-26

## 2019-11-16 RX ORDER — IPRATROPIUM BROMIDE 42 UG/1
2 SPRAY, METERED NASAL 3 TIMES DAILY
Qty: 15 ML | Refills: 0 | Status: SHIPPED | OUTPATIENT
Start: 2019-11-16 | End: 2019-11-20

## 2019-11-16 ASSESSMENT — ENCOUNTER SYMPTOMS
NEUROLOGICAL NEGATIVE: 1
EYE REDNESS: 0
WHEEZING: 0
CHILLS: 0
EYE DISCHARGE: 0
NAUSEA: 0
FEVER: 1
PSYCHIATRIC NEGATIVE: 1
SORE THROAT: 1
COUGH: 1
DIARRHEA: 0
VOMITING: 0
RHINORRHEA: 1

## 2019-11-29 DIAGNOSIS — I10 ESSENTIAL HYPERTENSION WITH GOAL BLOOD PRESSURE LESS THAN 140/90: ICD-10-CM

## 2019-12-02 ENCOUNTER — WALK IN (OUTPATIENT)
Dept: URGENT CARE | Age: 59
End: 2019-12-02

## 2019-12-02 DIAGNOSIS — J06.9 VIRAL UPPER RESPIRATORY TRACT INFECTION: Primary | ICD-10-CM

## 2019-12-02 LAB
INTERNAL PROCEDURAL CONTROLS ACCEPTABLE: YES
S PYO AG THROAT QL IA.RAPID: NEGATIVE

## 2019-12-02 PROCEDURE — 3078F DIAST BP <80 MM HG: CPT | Performed by: NURSE PRACTITIONER

## 2019-12-02 PROCEDURE — 3074F SYST BP LT 130 MM HG: CPT | Performed by: NURSE PRACTITIONER

## 2019-12-02 PROCEDURE — 87880 STREP A ASSAY W/OPTIC: CPT | Performed by: NURSE PRACTITIONER

## 2019-12-02 PROCEDURE — 99213 OFFICE O/P EST LOW 20 MIN: CPT | Performed by: NURSE PRACTITIONER

## 2019-12-02 RX ORDER — LISINOPRIL AND HYDROCHLOROTHIAZIDE 12.5; 1 MG/1; MG/1
1 TABLET ORAL DAILY
Qty: 90 TABLET | Refills: 0 | Status: SHIPPED | OUTPATIENT
Start: 2019-12-02 | End: 2020-05-28

## 2019-12-02 ASSESSMENT — ENCOUNTER SYMPTOMS
RHINORRHEA: 1
SORE THROAT: 1
HEADACHES: 0
DIZZINESS: 0
CHILLS: 0
FEVER: 0
WHEEZING: 0
COUGH: 1
SHORTNESS OF BREATH: 0

## 2019-12-02 ASSESSMENT — PAIN SCALES - GENERAL: PAINLEVEL: 5-6

## 2019-12-23 ENCOUNTER — OFFICE VISIT (OUTPATIENT)
Dept: FAMILY MEDICINE CLINIC | Facility: CLINIC | Age: 59
End: 2019-12-23
Payer: COMMERCIAL

## 2019-12-23 VITALS
SYSTOLIC BLOOD PRESSURE: 128 MMHG | HEART RATE: 98 BPM | WEIGHT: 210 LBS | BODY MASS INDEX: 31.83 KG/M2 | TEMPERATURE: 98 F | DIASTOLIC BLOOD PRESSURE: 78 MMHG | OXYGEN SATURATION: 97 % | RESPIRATION RATE: 16 BRPM | HEIGHT: 68 IN

## 2019-12-23 DIAGNOSIS — N30.01 ACUTE CYSTITIS WITH HEMATURIA: Primary | ICD-10-CM

## 2019-12-23 DIAGNOSIS — R39.9 UTI SYMPTOMS: ICD-10-CM

## 2019-12-23 PROCEDURE — 81003 URINALYSIS AUTO W/O SCOPE: CPT | Performed by: NURSE PRACTITIONER

## 2019-12-23 PROCEDURE — 87086 URINE CULTURE/COLONY COUNT: CPT | Performed by: NURSE PRACTITIONER

## 2019-12-23 PROCEDURE — 99213 OFFICE O/P EST LOW 20 MIN: CPT | Performed by: NURSE PRACTITIONER

## 2019-12-23 RX ORDER — NITROFURANTOIN 25; 75 MG/1; MG/1
100 CAPSULE ORAL 2 TIMES DAILY
Qty: 10 CAPSULE | Refills: 0 | Status: SHIPPED | OUTPATIENT
Start: 2019-12-23 | End: 2019-12-28

## 2019-12-23 NOTE — PROGRESS NOTES
Kamlesh Colón is a 60year old female. HPI:   Patient presents with symptoms of UTI for 2 days. Complaining of urinary frequency, urgency, dysuria,    Denies back pain, fever, hematuria.   Pt has history of UTI in past.    Nitrofurantoin Monohyd Macro 100 GI: no nausea or vomiting  : as above. NEURO: denies headaches or dizziness.     EXAM:   /78   Pulse 98   Temp 98.2 °F (36.8 °C) (Oral)   Resp 16   Ht 68\"   Wt 210 lb (95.3 kg)   SpO2 97%   BMI 31.93 kg/m²   GENERAL: well developed, well nourished · Bacteria on the skin outside the rectum may travel into the urethra. This is more common in women since the rectum and urethra are closer to each other than in men.  Wiping from front to back after using the toilet and keeping the area clean can help prev

## 2020-05-25 DIAGNOSIS — I10 ESSENTIAL HYPERTENSION WITH GOAL BLOOD PRESSURE LESS THAN 140/90: ICD-10-CM

## 2020-05-28 RX ORDER — LISINOPRIL AND HYDROCHLOROTHIAZIDE 12.5; 1 MG/1; MG/1
1 TABLET ORAL DAILY
Qty: 90 TABLET | Refills: 0 | Status: SHIPPED | OUTPATIENT
Start: 2020-05-28 | End: 2020-10-18

## 2020-10-16 ENCOUNTER — LAB ENCOUNTER (OUTPATIENT)
Dept: LAB | Age: 60
End: 2020-10-16
Attending: FAMILY MEDICINE
Payer: COMMERCIAL

## 2020-10-16 ENCOUNTER — OFFICE VISIT (OUTPATIENT)
Dept: FAMILY MEDICINE CLINIC | Facility: CLINIC | Age: 60
End: 2020-10-16
Payer: COMMERCIAL

## 2020-10-16 VITALS
HEIGHT: 68 IN | RESPIRATION RATE: 20 BRPM | OXYGEN SATURATION: 98 % | DIASTOLIC BLOOD PRESSURE: 76 MMHG | TEMPERATURE: 97 F | WEIGHT: 255 LBS | HEART RATE: 78 BPM | BODY MASS INDEX: 38.65 KG/M2 | SYSTOLIC BLOOD PRESSURE: 134 MMHG

## 2020-10-16 DIAGNOSIS — R73.09 ELEVATED GLUCOSE: ICD-10-CM

## 2020-10-16 DIAGNOSIS — Z00.00 ANNUAL PHYSICAL EXAM: ICD-10-CM

## 2020-10-16 DIAGNOSIS — I10 ESSENTIAL HYPERTENSION WITH GOAL BLOOD PRESSURE LESS THAN 140/90: ICD-10-CM

## 2020-10-16 DIAGNOSIS — Z12.4 SCREENING FOR CERVICAL CANCER: ICD-10-CM

## 2020-10-16 DIAGNOSIS — Z11.1 SCREENING-PULMONARY TB: ICD-10-CM

## 2020-10-16 DIAGNOSIS — E55.9 VITAMIN D DEFICIENCY: ICD-10-CM

## 2020-10-16 DIAGNOSIS — Z78.0 POST-MENOPAUSAL: ICD-10-CM

## 2020-10-16 DIAGNOSIS — Z00.00 ANNUAL PHYSICAL EXAM: Primary | ICD-10-CM

## 2020-10-16 DIAGNOSIS — Z12.31 ENCOUNTER FOR SCREENING MAMMOGRAM FOR MALIGNANT NEOPLASM OF BREAST: ICD-10-CM

## 2020-10-16 DIAGNOSIS — Z12.11 COLON CANCER SCREENING: ICD-10-CM

## 2020-10-16 PROCEDURE — 84439 ASSAY OF FREE THYROXINE: CPT | Performed by: FAMILY MEDICINE

## 2020-10-16 PROCEDURE — 36415 COLL VENOUS BLD VENIPUNCTURE: CPT | Performed by: FAMILY MEDICINE

## 2020-10-16 PROCEDURE — 3078F DIAST BP <80 MM HG: CPT | Performed by: FAMILY MEDICINE

## 2020-10-16 PROCEDURE — 80061 LIPID PANEL: CPT | Performed by: FAMILY MEDICINE

## 2020-10-16 PROCEDURE — 3008F BODY MASS INDEX DOCD: CPT | Performed by: FAMILY MEDICINE

## 2020-10-16 PROCEDURE — 87624 HPV HI-RISK TYP POOLED RSLT: CPT | Performed by: FAMILY MEDICINE

## 2020-10-16 PROCEDURE — 88175 CYTOPATH C/V AUTO FLUID REDO: CPT | Performed by: FAMILY MEDICINE

## 2020-10-16 PROCEDURE — 3075F SYST BP GE 130 - 139MM HG: CPT | Performed by: FAMILY MEDICINE

## 2020-10-16 PROCEDURE — 82306 VITAMIN D 25 HYDROXY: CPT | Performed by: FAMILY MEDICINE

## 2020-10-16 PROCEDURE — 86480 TB TEST CELL IMMUN MEASURE: CPT | Performed by: FAMILY MEDICINE

## 2020-10-16 PROCEDURE — 83036 HEMOGLOBIN GLYCOSYLATED A1C: CPT | Performed by: FAMILY MEDICINE

## 2020-10-16 PROCEDURE — 99396 PREV VISIT EST AGE 40-64: CPT | Performed by: FAMILY MEDICINE

## 2020-10-16 PROCEDURE — 80050 GENERAL HEALTH PANEL: CPT | Performed by: FAMILY MEDICINE

## 2020-10-16 NOTE — H&P
Jessa Payne is a 63year old female who presents for a well woman physical exam:       Hx of vitamin D deficiency. This is a chronic problem. This problem onset over 1 year ago. This problem occurs constantly. Pt does not have any current fractures.  Pt do due  Colonoscopy: due  Dental Visit is up to date  Vision Exam is up to date    Osteoperosis Prevention was discussed. Reviewed Calcium, Vitamin D supplementation and Weight Bearing Exercises.     Patient is not currently taking calcium and Vitamin D suppl based on results. - CBC WITH DIFFERENTIAL WITH PLATELET; Future  - COMP METABOLIC PANEL (14); Future  - LIPID PANEL; Future  - TSH W REFLEX TO FREE T4; Future    2. Vitamin D deficiency  Due for repeat blood work at this time. Follow up based on results.

## 2020-10-16 NOTE — PROGRESS NOTES
HPI:    Patient ID: Fiordaliza Wu is a 63year old female.     HPI    Review of Systems         Current Outpatient Medications   Medication Sig Dispense Refill   • LISINOPRIL-HYDROCHLOROTHIAZIDE 10-12.5 MG Oral Tab TAKE 1 TABLET BY MOUTH DAILY 90 tablet 0

## 2020-10-18 DIAGNOSIS — I10 ESSENTIAL HYPERTENSION WITH GOAL BLOOD PRESSURE LESS THAN 140/90: ICD-10-CM

## 2020-10-19 ENCOUNTER — PATIENT MESSAGE (OUTPATIENT)
Dept: FAMILY MEDICINE CLINIC | Facility: CLINIC | Age: 60
End: 2020-10-19

## 2020-10-19 RX ORDER — LISINOPRIL AND HYDROCHLOROTHIAZIDE 12.5; 1 MG/1; MG/1
1 TABLET ORAL DAILY
Qty: 90 TABLET | Refills: 0 | Status: SHIPPED | OUTPATIENT
Start: 2020-10-19 | End: 2021-01-12

## 2020-10-19 RX ORDER — LISINOPRIL AND HYDROCHLOROTHIAZIDE 12.5; 1 MG/1; MG/1
1 TABLET ORAL DAILY
Qty: 90 TABLET | Refills: 0 | OUTPATIENT
Start: 2020-10-19

## 2020-10-20 NOTE — TELEPHONE ENCOUNTER
From: Deborah Gonzalez  To: Marcellus Scott DO  Sent: 10/19/2020 7:30 AM CDT  Subject: Other    Hello Dr. Deidre Padron,  I noticed my weight was recorded as 32 pounds more than my home scale on the notes from my visit on Friday.  I realize it may be a bit higher because

## 2021-01-12 DIAGNOSIS — I10 ESSENTIAL HYPERTENSION WITH GOAL BLOOD PRESSURE LESS THAN 140/90: ICD-10-CM

## 2021-01-12 RX ORDER — LISINOPRIL AND HYDROCHLOROTHIAZIDE 12.5; 1 MG/1; MG/1
1 TABLET ORAL DAILY
Qty: 90 TABLET | Refills: 0 | Status: SHIPPED | OUTPATIENT
Start: 2021-01-12 | End: 2021-04-09

## 2021-03-16 ENCOUNTER — LAB ENCOUNTER (OUTPATIENT)
Dept: LAB | Age: 61
End: 2021-03-16
Attending: STUDENT IN AN ORGANIZED HEALTH CARE EDUCATION/TRAINING PROGRAM
Payer: COMMERCIAL

## 2021-03-16 DIAGNOSIS — Z01.818 PRE-OP TESTING: ICD-10-CM

## 2021-03-17 LAB — SARS-COV-2 RNA RESP QL NAA+PROBE: NOT DETECTED

## 2021-03-19 PROBLEM — Z12.11 SPECIAL SCREENING FOR MALIGNANT NEOPLASM OF COLON: Status: ACTIVE | Noted: 2021-03-19

## 2021-04-09 DIAGNOSIS — I10 ESSENTIAL HYPERTENSION WITH GOAL BLOOD PRESSURE LESS THAN 140/90: ICD-10-CM

## 2021-04-09 RX ORDER — LISINOPRIL AND HYDROCHLOROTHIAZIDE 12.5; 1 MG/1; MG/1
1 TABLET ORAL DAILY
Qty: 90 TABLET | Refills: 0 | Status: SHIPPED | OUTPATIENT
Start: 2021-04-09 | End: 2021-07-06

## 2021-04-10 ENCOUNTER — OFFICE VISIT (OUTPATIENT)
Dept: FAMILY MEDICINE CLINIC | Facility: CLINIC | Age: 61
End: 2021-04-10

## 2021-04-10 VITALS
TEMPERATURE: 98 F | RESPIRATION RATE: 16 BRPM | WEIGHT: 218 LBS | HEIGHT: 68 IN | OXYGEN SATURATION: 97 % | HEART RATE: 71 BPM | BODY MASS INDEX: 33.04 KG/M2 | SYSTOLIC BLOOD PRESSURE: 146 MMHG | DIASTOLIC BLOOD PRESSURE: 74 MMHG

## 2021-04-10 DIAGNOSIS — Z02.1 PHYSICAL EXAM, PRE-EMPLOYMENT: Primary | ICD-10-CM

## 2021-04-10 PROCEDURE — 3008F BODY MASS INDEX DOCD: CPT | Performed by: NURSE PRACTITIONER

## 2021-04-10 PROCEDURE — 3078F DIAST BP <80 MM HG: CPT | Performed by: NURSE PRACTITIONER

## 2021-04-10 PROCEDURE — 99396 PREV VISIT EST AGE 40-64: CPT | Performed by: NURSE PRACTITIONER

## 2021-04-10 PROCEDURE — 3077F SYST BP >= 140 MM HG: CPT | Performed by: NURSE PRACTITIONER

## 2021-04-10 NOTE — PROGRESS NOTES
CHIEF COMPLAINT:   No chief complaint on file. HPI:   Mauro Huang is a 63year old female who presents for a work physical exam. Patient will be starting at the [address] [facility].   Patient is in good health and denies chest pains, feels well, no fatigue. SKIN: denies any unusual skin lesions or rashes.  Denies history of MRSA  EYES: no visual complaints, no deficits   HEENT: denies nasal congestion, sinus pain or sore throat, or hearing loss   RESPIRATORY: denies shortness of breath noted.  Lymphadenopathy: No cervical or supraclavicular adenopathy. Neuro: Alert and oriented to person, place, and time. Triceps, brachioradialis and patella DTRs are +2/4 and symmetric. Cranial nerves 2-10 grossly intact.  Able to duck walk without dif

## 2021-05-26 VITALS
HEIGHT: 67 IN | HEART RATE: 80 BPM | WEIGHT: 210 LBS | SYSTOLIC BLOOD PRESSURE: 120 MMHG | OXYGEN SATURATION: 98 % | RESPIRATION RATE: 16 BRPM | TEMPERATURE: 97.3 F | BODY MASS INDEX: 32.96 KG/M2 | DIASTOLIC BLOOD PRESSURE: 70 MMHG

## 2021-06-20 ENCOUNTER — OFFICE VISIT (OUTPATIENT)
Dept: FAMILY MEDICINE CLINIC | Facility: CLINIC | Age: 61
End: 2021-06-20
Payer: COMMERCIAL

## 2021-06-20 DIAGNOSIS — R05.9 COUGH: Primary | ICD-10-CM

## 2021-06-20 DIAGNOSIS — J02.9 PHARYNGITIS, UNSPECIFIED ETIOLOGY: ICD-10-CM

## 2021-06-20 PROCEDURE — 3078F DIAST BP <80 MM HG: CPT | Performed by: NURSE PRACTITIONER

## 2021-06-20 PROCEDURE — 3074F SYST BP LT 130 MM HG: CPT | Performed by: NURSE PRACTITIONER

## 2021-06-20 PROCEDURE — 99213 OFFICE O/P EST LOW 20 MIN: CPT | Performed by: NURSE PRACTITIONER

## 2021-06-20 PROCEDURE — 87880 STREP A ASSAY W/OPTIC: CPT | Performed by: NURSE PRACTITIONER

## 2021-06-20 PROCEDURE — 87081 CULTURE SCREEN ONLY: CPT | Performed by: NURSE PRACTITIONER

## 2021-06-20 RX ORDER — BENZONATATE 200 MG/1
CAPSULE ORAL
Qty: 30 CAPSULE | Refills: 0 | Status: SHIPPED | OUTPATIENT
Start: 2021-06-20

## 2021-06-21 VITALS
SYSTOLIC BLOOD PRESSURE: 92 MMHG | HEART RATE: 68 BPM | RESPIRATION RATE: 16 BRPM | OXYGEN SATURATION: 98 % | DIASTOLIC BLOOD PRESSURE: 58 MMHG | TEMPERATURE: 98 F

## 2021-06-21 NOTE — PROGRESS NOTES
CHIEF COMPLAINT:   \" Patient presents with:  Cough  Sore Throat    HPI:   Elisabet Handy is a 57year old female who presents to the [facility] with a hx of a sore throat and cough which has persisted for 4 days.   Patient has felt ill and fatigued and has had some Social History    Tobacco Use      Smoking status: Never Smoker      Smokeless tobacco: Never Used    Vaping Use      Vaping Use: Never used    Alcohol use:  Yes      Alcohol/week: 2.5 standard drinks      Types: 3 Standard drinks or equivalent per week 15 mg + Menthol.  - Trial Zyrtec or another 24 hour non-sedating antihistamine.  - Await Throat Culture result. Advised patient to follow up with Dr. Sherry Pelaez if not improving with each day. Advised to go directly to the ED for any worsening of symptoms.

## 2021-07-04 DIAGNOSIS — I10 ESSENTIAL HYPERTENSION WITH GOAL BLOOD PRESSURE LESS THAN 140/90: ICD-10-CM

## 2021-07-06 RX ORDER — LISINOPRIL AND HYDROCHLOROTHIAZIDE 12.5; 1 MG/1; MG/1
1 TABLET ORAL DAILY
Qty: 90 TABLET | Refills: 0 | Status: SHIPPED | OUTPATIENT
Start: 2021-07-06 | End: 2021-10-14

## 2021-10-14 DIAGNOSIS — I10 ESSENTIAL HYPERTENSION WITH GOAL BLOOD PRESSURE LESS THAN 140/90: ICD-10-CM

## 2021-10-14 RX ORDER — LISINOPRIL AND HYDROCHLOROTHIAZIDE 12.5; 1 MG/1; MG/1
1 TABLET ORAL DAILY
Qty: 90 TABLET | Refills: 0 | Status: SHIPPED | OUTPATIENT
Start: 2021-10-14 | End: 2022-01-08

## 2022-01-08 DIAGNOSIS — I10 ESSENTIAL HYPERTENSION WITH GOAL BLOOD PRESSURE LESS THAN 140/90: ICD-10-CM

## 2022-01-08 RX ORDER — LISINOPRIL AND HYDROCHLOROTHIAZIDE 12.5; 1 MG/1; MG/1
1 TABLET ORAL DAILY
Qty: 90 TABLET | Refills: 0 | Status: SHIPPED | OUTPATIENT
Start: 2022-01-08

## 2022-01-24 ENCOUNTER — TELEMEDICINE (OUTPATIENT)
Dept: FAMILY MEDICINE CLINIC | Facility: CLINIC | Age: 62
End: 2022-01-24
Payer: COMMERCIAL

## 2022-01-24 DIAGNOSIS — I10 ESSENTIAL HYPERTENSION: Primary | ICD-10-CM

## 2022-01-24 PROCEDURE — 99213 OFFICE O/P EST LOW 20 MIN: CPT | Performed by: PHYSICIAN ASSISTANT

## 2022-01-24 NOTE — PROGRESS NOTES
Video Visit    Elle Smith is a 57year old female. No chief complaint on file. HPI:   Patient presents for follow up of hypertension and medication refill. She was last seen in the office 10/2020.  She is currently taking Lisinopril-hydrochlorothiaz Types: 3 Standard drinks or equivalent per week      Comment: social    Drug use: No       REVIEW OF SYSTEMS:   GENERAL HEALTH: Denies fevers, chills, sweats, and fatigue. EYES: Denies vision changes, eye redness, itching, or drainage.    HENT: Denies hear provider-patient relationship, due to the COVID -19 public health crisis/national emergency where restrictions of face-to-face office visits are ongoing. Every conscious effort was taken to allow for sufficient and adequate time to complete the visit.   The

## 2022-04-07 RX ORDER — LISINOPRIL AND HYDROCHLOROTHIAZIDE 12.5; 1 MG/1; MG/1
1 TABLET ORAL DAILY
Qty: 90 TABLET | Refills: 0 | Status: SHIPPED | OUTPATIENT
Start: 2022-04-07

## 2022-06-23 ENCOUNTER — OFFICE VISIT (OUTPATIENT)
Dept: FAMILY MEDICINE CLINIC | Facility: CLINIC | Age: 62
End: 2022-06-23
Payer: COMMERCIAL

## 2022-06-23 VITALS
HEART RATE: 106 BPM | SYSTOLIC BLOOD PRESSURE: 112 MMHG | WEIGHT: 248 LBS | HEIGHT: 69 IN | OXYGEN SATURATION: 97 % | TEMPERATURE: 98 F | BODY MASS INDEX: 36.73 KG/M2 | DIASTOLIC BLOOD PRESSURE: 78 MMHG

## 2022-06-23 DIAGNOSIS — S39.012A STRAIN OF LUMBAR REGION, INITIAL ENCOUNTER: Primary | ICD-10-CM

## 2022-06-23 PROCEDURE — 3008F BODY MASS INDEX DOCD: CPT | Performed by: PHYSICIAN ASSISTANT

## 2022-06-23 PROCEDURE — 99213 OFFICE O/P EST LOW 20 MIN: CPT | Performed by: PHYSICIAN ASSISTANT

## 2022-06-23 PROCEDURE — 3074F SYST BP LT 130 MM HG: CPT | Performed by: PHYSICIAN ASSISTANT

## 2022-06-23 PROCEDURE — 3078F DIAST BP <80 MM HG: CPT | Performed by: PHYSICIAN ASSISTANT

## 2022-06-23 RX ORDER — CYCLOBENZAPRINE HCL 10 MG
10 TABLET ORAL 3 TIMES DAILY PRN
Qty: 30 TABLET | Refills: 0 | Status: SHIPPED | OUTPATIENT
Start: 2022-06-23

## 2022-07-05 DIAGNOSIS — I10 ESSENTIAL HYPERTENSION WITH GOAL BLOOD PRESSURE LESS THAN 140/90: ICD-10-CM

## 2022-07-05 RX ORDER — LISINOPRIL AND HYDROCHLOROTHIAZIDE 12.5; 1 MG/1; MG/1
1 TABLET ORAL DAILY
Qty: 90 TABLET | Refills: 0 | Status: SHIPPED | OUTPATIENT
Start: 2022-07-05

## 2022-08-28 ENCOUNTER — WALK IN (OUTPATIENT)
Dept: URGENT CARE | Age: 62
End: 2022-08-28

## 2022-08-28 VITALS
RESPIRATION RATE: 18 BRPM | SYSTOLIC BLOOD PRESSURE: 139 MMHG | DIASTOLIC BLOOD PRESSURE: 88 MMHG | TEMPERATURE: 98.4 F | OXYGEN SATURATION: 99 % | HEART RATE: 85 BPM | WEIGHT: 220 LBS | BODY MASS INDEX: 33.34 KG/M2 | HEIGHT: 68 IN

## 2022-08-28 DIAGNOSIS — N30.01 ACUTE CYSTITIS WITH HEMATURIA: Primary | ICD-10-CM

## 2022-08-28 LAB
APPEARANCE, POC: ABNORMAL
BILIRUBIN, POC: NEGATIVE
COLOR, POC: YELLOW
GLUCOSE UR-MCNC: NEGATIVE MG/DL
KETONES, POC: NEGATIVE MG/DL
NITRITE, POC: POSITIVE
OCCULT BLOOD, POC: ABNORMAL
PH UR: 7 [PH] (ref 5–7)
PROT UR-MCNC: ABNORMAL MG/DL
SP GR UR: 1.01 (ref 1–1.03)
UROBILINOGEN UR-MCNC: 0.2 MG/DL (ref 0–1)
WBC (LEUKOCYTE) ESTERASE, POC: ABNORMAL

## 2022-08-28 PROCEDURE — 3075F SYST BP GE 130 - 139MM HG: CPT | Performed by: NURSE PRACTITIONER

## 2022-08-28 PROCEDURE — 87086 URINE CULTURE/COLONY COUNT: CPT | Performed by: INTERNAL MEDICINE

## 2022-08-28 PROCEDURE — 99213 OFFICE O/P EST LOW 20 MIN: CPT | Performed by: NURSE PRACTITIONER

## 2022-08-28 PROCEDURE — 3079F DIAST BP 80-89 MM HG: CPT | Performed by: NURSE PRACTITIONER

## 2022-08-28 PROCEDURE — 81002 URINALYSIS NONAUTO W/O SCOPE: CPT | Performed by: NURSE PRACTITIONER

## 2022-08-28 RX ORDER — NITROFURANTOIN 25; 75 MG/1; MG/1
100 CAPSULE ORAL 2 TIMES DAILY
Qty: 10 CAPSULE | Refills: 0 | Status: SHIPPED | OUTPATIENT
Start: 2022-08-28 | End: 2022-09-02

## 2022-08-28 ASSESSMENT — ENCOUNTER SYMPTOMS
NAUSEA: 0
RHINORRHEA: 0
EYE ITCHING: 0
BACK PAIN: 0
APPETITE CHANGE: 0
TROUBLE SWALLOWING: 0
SINUS PAIN: 0
EYE REDNESS: 0
DIZZINESS: 0
ACTIVITY CHANGE: 0
COLOR CHANGE: 0
WHEEZING: 0
LIGHT-HEADEDNESS: 0
COUGH: 0
FATIGUE: 0
SORE THROAT: 0
EYE PAIN: 0
DIAPHORESIS: 0
VOMITING: 0
FACIAL SWELLING: 0
CHEST TIGHTNESS: 0
ABDOMINAL PAIN: 0
SINUS PRESSURE: 0
EYE DISCHARGE: 0
WEAKNESS: 0
HEADACHES: 0
CHILLS: 0
PHOTOPHOBIA: 0
DIARRHEA: 0
SHORTNESS OF BREATH: 0
FEVER: 0

## 2022-08-29 LAB — BACTERIA UR CULT: NORMAL

## 2022-08-30 ENCOUNTER — TELEPHONE (OUTPATIENT)
Dept: URGENT CARE | Age: 62
End: 2022-08-30

## 2022-09-04 ENCOUNTER — HOSPITAL ENCOUNTER (EMERGENCY)
Age: 62
Discharge: HOME | End: 2022-09-04
Attending: EMERGENCY MEDICINE
Payer: COMMERCIAL

## 2022-09-04 ENCOUNTER — APPOINTMENT (OUTPATIENT)
Dept: ULTRASOUND IMAGING | Age: 62
End: 2022-09-04
Attending: EMERGENCY MEDICINE
Payer: COMMERCIAL

## 2022-09-04 VITALS
BODY MASS INDEX: 34.1 KG/M2 | WEIGHT: 225 LBS | SYSTOLIC BLOOD PRESSURE: 171 MMHG | DIASTOLIC BLOOD PRESSURE: 72 MMHG | OXYGEN SATURATION: 99 % | TEMPERATURE: 98 F | HEIGHT: 68 IN | HEART RATE: 104 BPM | RESPIRATION RATE: 20 BRPM

## 2022-09-04 DIAGNOSIS — R60.9 PERIPHERAL EDEMA: Primary | ICD-10-CM

## 2022-09-04 PROCEDURE — 99284 EMERGENCY DEPT VISIT MOD MDM: CPT

## 2022-09-04 PROCEDURE — 93971 EXTREMITY STUDY: CPT | Performed by: EMERGENCY MEDICINE

## 2022-09-04 NOTE — ED INITIAL ASSESSMENT (HPI)
Patient arrives from home for evaluation of right ankle and leg swelling with calf pain.  Recent road trip

## 2022-10-07 ENCOUNTER — OFFICE VISIT (OUTPATIENT)
Dept: FAMILY MEDICINE CLINIC | Facility: CLINIC | Age: 62
End: 2022-10-07
Payer: COMMERCIAL

## 2022-10-07 VITALS
OXYGEN SATURATION: 98 % | RESPIRATION RATE: 16 BRPM | BODY MASS INDEX: 37.89 KG/M2 | DIASTOLIC BLOOD PRESSURE: 80 MMHG | WEIGHT: 250 LBS | HEIGHT: 68 IN | SYSTOLIC BLOOD PRESSURE: 152 MMHG | HEART RATE: 78 BPM

## 2022-10-07 DIAGNOSIS — I10 ESSENTIAL HYPERTENSION WITH GOAL BLOOD PRESSURE LESS THAN 140/90: Primary | ICD-10-CM

## 2022-10-07 DIAGNOSIS — Z00.00 GENERAL MEDICAL EXAM: ICD-10-CM

## 2022-10-07 DIAGNOSIS — E66.09 CLASS 2 OBESITY DUE TO EXCESS CALORIES WITHOUT SERIOUS COMORBIDITY WITH BODY MASS INDEX (BMI) OF 38.0 TO 38.9 IN ADULT: ICD-10-CM

## 2022-10-07 PROCEDURE — 3077F SYST BP >= 140 MM HG: CPT | Performed by: PHYSICIAN ASSISTANT

## 2022-10-07 PROCEDURE — 3008F BODY MASS INDEX DOCD: CPT | Performed by: PHYSICIAN ASSISTANT

## 2022-10-07 PROCEDURE — 99214 OFFICE O/P EST MOD 30 MIN: CPT | Performed by: PHYSICIAN ASSISTANT

## 2022-10-07 PROCEDURE — 3079F DIAST BP 80-89 MM HG: CPT | Performed by: PHYSICIAN ASSISTANT

## 2022-10-07 RX ORDER — LISINOPRIL AND HYDROCHLOROTHIAZIDE 12.5; 1 MG/1; MG/1
1 TABLET ORAL DAILY
Qty: 90 TABLET | Refills: 1 | Status: SHIPPED | OUTPATIENT
Start: 2022-10-07

## 2022-11-03 ENCOUNTER — OFFICE VISIT (OUTPATIENT)
Dept: FAMILY MEDICINE CLINIC | Facility: CLINIC | Age: 62
End: 2022-11-03
Payer: COMMERCIAL

## 2022-11-03 VITALS
WEIGHT: 248.19 LBS | OXYGEN SATURATION: 99 % | DIASTOLIC BLOOD PRESSURE: 72 MMHG | HEIGHT: 68 IN | RESPIRATION RATE: 16 BRPM | HEART RATE: 82 BPM | BODY MASS INDEX: 37.62 KG/M2 | SYSTOLIC BLOOD PRESSURE: 140 MMHG

## 2022-11-03 DIAGNOSIS — M25.561 ACUTE PAIN OF RIGHT KNEE: Primary | ICD-10-CM

## 2022-11-03 PROCEDURE — 99213 OFFICE O/P EST LOW 20 MIN: CPT | Performed by: PHYSICIAN ASSISTANT

## 2022-11-03 PROCEDURE — 3008F BODY MASS INDEX DOCD: CPT | Performed by: PHYSICIAN ASSISTANT

## 2022-11-03 PROCEDURE — 3078F DIAST BP <80 MM HG: CPT | Performed by: PHYSICIAN ASSISTANT

## 2022-11-03 PROCEDURE — 3077F SYST BP >= 140 MM HG: CPT | Performed by: PHYSICIAN ASSISTANT

## 2023-02-20 ENCOUNTER — HOSPITAL ENCOUNTER (EMERGENCY)
Age: 63
Discharge: HOME | End: 2023-02-20
Payer: COMMERCIAL

## 2023-02-20 VITALS
HEART RATE: 95 BPM | BODY MASS INDEX: 36.37 KG/M2 | TEMPERATURE: 98 F | DIASTOLIC BLOOD PRESSURE: 83 MMHG | OXYGEN SATURATION: 98 % | HEIGHT: 68 IN | RESPIRATION RATE: 16 BRPM | WEIGHT: 240 LBS | SYSTOLIC BLOOD PRESSURE: 175 MMHG

## 2023-02-20 DIAGNOSIS — S39.012A STRAIN OF LUMBAR REGION, INITIAL ENCOUNTER: Primary | ICD-10-CM

## 2023-02-20 PROCEDURE — 99284 EMERGENCY DEPT VISIT MOD MDM: CPT

## 2023-02-20 PROCEDURE — 99283 EMERGENCY DEPT VISIT LOW MDM: CPT

## 2023-02-20 RX ORDER — IBUPROFEN 600 MG/1
600 TABLET ORAL EVERY 8 HOURS PRN
Qty: 30 TABLET | Refills: 0 | Status: SHIPPED | OUTPATIENT
Start: 2023-02-20 | End: 2023-02-27

## 2023-02-20 NOTE — ED INITIAL ASSESSMENT (HPI)
Right lower back pain, \"it feels like nerve pain\", started more then  one week ago after bending over, during the night was worse, denies pain now, last Motrin 0181

## 2023-02-20 NOTE — DISCHARGE INSTRUCTIONS
Rest, heat packs, light massage. Take ibuprofen 600 mg every 6-8 hours with food. You may also take extra strength Tylenol -up to 1 g every 6 hours, not to exceed 4 g in a 24-hour period. Follow-up with your primary care provider. You may benefit from physical therapy if symptoms persist.  Go to ER with any new or worsening symptoms.

## 2023-02-21 ENCOUNTER — TELEPHONE (OUTPATIENT)
Dept: FAMILY MEDICINE CLINIC | Facility: CLINIC | Age: 63
End: 2023-02-21

## 2023-02-21 NOTE — TELEPHONE ENCOUNTER
Pt over due for visit 2.5 years. If no longer seeing us please have insurance change who is her PCP.   If we are still PCP please have her come in for a physical.

## 2023-02-27 ENCOUNTER — OFFICE VISIT (OUTPATIENT)
Dept: FAMILY MEDICINE CLINIC | Facility: CLINIC | Age: 63
End: 2023-02-27
Payer: COMMERCIAL

## 2023-02-27 VITALS
OXYGEN SATURATION: 99 % | HEIGHT: 68 IN | HEART RATE: 78 BPM | BODY MASS INDEX: 36.37 KG/M2 | DIASTOLIC BLOOD PRESSURE: 72 MMHG | SYSTOLIC BLOOD PRESSURE: 132 MMHG | WEIGHT: 240 LBS | RESPIRATION RATE: 18 BRPM

## 2023-02-27 DIAGNOSIS — I10 ESSENTIAL HYPERTENSION: ICD-10-CM

## 2023-02-27 DIAGNOSIS — Z86.16 HISTORY OF COVID-19: ICD-10-CM

## 2023-02-27 DIAGNOSIS — M54.50 ACUTE RIGHT-SIDED LOW BACK PAIN WITHOUT SCIATICA: Primary | ICD-10-CM

## 2023-02-27 PROCEDURE — 3078F DIAST BP <80 MM HG: CPT | Performed by: PHYSICIAN ASSISTANT

## 2023-02-27 PROCEDURE — 3075F SYST BP GE 130 - 139MM HG: CPT | Performed by: PHYSICIAN ASSISTANT

## 2023-02-27 PROCEDURE — 99214 OFFICE O/P EST MOD 30 MIN: CPT | Performed by: PHYSICIAN ASSISTANT

## 2023-02-27 PROCEDURE — 3008F BODY MASS INDEX DOCD: CPT | Performed by: PHYSICIAN ASSISTANT

## 2023-04-06 DIAGNOSIS — I10 ESSENTIAL HYPERTENSION WITH GOAL BLOOD PRESSURE LESS THAN 140/90: ICD-10-CM

## 2023-04-06 RX ORDER — LISINOPRIL AND HYDROCHLOROTHIAZIDE 12.5; 1 MG/1; MG/1
1 TABLET ORAL DAILY
Qty: 90 TABLET | Refills: 1 | Status: SHIPPED | OUTPATIENT
Start: 2023-04-06

## 2023-09-20 ENCOUNTER — OFFICE VISIT (OUTPATIENT)
Dept: FAMILY MEDICINE CLINIC | Facility: CLINIC | Age: 63
End: 2023-09-20
Payer: COMMERCIAL

## 2023-09-20 VITALS
OXYGEN SATURATION: 97 % | HEART RATE: 97 BPM | SYSTOLIC BLOOD PRESSURE: 144 MMHG | DIASTOLIC BLOOD PRESSURE: 84 MMHG | HEIGHT: 68 IN | RESPIRATION RATE: 17 BRPM | BODY MASS INDEX: 38.95 KG/M2 | WEIGHT: 257 LBS

## 2023-09-20 DIAGNOSIS — L72.3 SEBACEOUS CYST: Primary | ICD-10-CM

## 2023-09-20 PROCEDURE — 90686 IIV4 VACC NO PRSV 0.5 ML IM: CPT | Performed by: PHYSICIAN ASSISTANT

## 2023-09-20 PROCEDURE — 3079F DIAST BP 80-89 MM HG: CPT | Performed by: PHYSICIAN ASSISTANT

## 2023-09-20 PROCEDURE — 90471 IMMUNIZATION ADMIN: CPT | Performed by: PHYSICIAN ASSISTANT

## 2023-09-20 PROCEDURE — 3077F SYST BP >= 140 MM HG: CPT | Performed by: PHYSICIAN ASSISTANT

## 2023-09-20 PROCEDURE — 99213 OFFICE O/P EST LOW 20 MIN: CPT | Performed by: PHYSICIAN ASSISTANT

## 2023-09-20 PROCEDURE — 3008F BODY MASS INDEX DOCD: CPT | Performed by: PHYSICIAN ASSISTANT

## 2023-09-20 RX ORDER — CEPHALEXIN 500 MG/1
500 CAPSULE ORAL 2 TIMES DAILY
Qty: 14 CAPSULE | Refills: 0 | Status: SHIPPED | OUTPATIENT
Start: 2023-09-20 | End: 2023-09-27

## 2023-09-20 NOTE — PROGRESS NOTES
Subjective:   Patient ID: Kemal Dias is a 58year old female. Abscess  Pertinent negatives include no chills, diaphoresis or fever. Patient presents to have a small bump on her back evaluated  Noticed a pimple like bump on her back about a month ago  Not going away  Not too bothersome   Not getting larger  Unsure if it has drained  Not too painful or tender to touch but in a difficult spot to reach  It is causing her a lot of worry    History/Other:   Review of Systems   Constitutional:  Negative for chills, diaphoresis and fever. Skin:         Bump under skin of mid back     Current Outpatient Medications   Medication Sig Dispense Refill    LISINOPRIL-HYDROCHLOROTHIAZIDE 10-12.5 MG Oral Tab TAKE 1 TABLET BY MOUTH DAILY 90 tablet 1    Omega-3 1000 MG Oral Cap Take by mouth. Cholecalciferol (VITAMIN D) 1000 units Oral Tab Take by mouth. Allergies:No Known Allergies    Objective:   Physical Exam  Vitals and nursing note reviewed. Constitutional:       Appearance: Normal appearance. Skin:            Comments: Small sebaceous cyst of the mid back with overlying erythema, no active drainage   Neurological:      Mental Status: She is alert. Assessment & Plan:   1. Sebaceous cyst  Mildly infected sebaceous cyst, too small to drain. Empirically tx with keflex as directed. Take with food. Apply heat to the affected area. Follow up if symptoms worsen or do not improve in the coming days.

## 2023-10-13 DIAGNOSIS — I10 ESSENTIAL HYPERTENSION WITH GOAL BLOOD PRESSURE LESS THAN 140/90: ICD-10-CM

## 2023-10-13 RX ORDER — LISINOPRIL AND HYDROCHLOROTHIAZIDE 12.5; 1 MG/1; MG/1
1 TABLET ORAL DAILY
Qty: 90 TABLET | Refills: 1 | Status: SHIPPED | OUTPATIENT
Start: 2023-10-13

## 2023-10-13 NOTE — TELEPHONE ENCOUNTER
A refill request was received for:  Requested Prescriptions     Pending Prescriptions Disp Refills    LISINOPRIL-HYDROCHLOROTHIAZIDE 10-12.5 MG Oral Tab [Pharmacy Med Name: LISINOPRIL-HCTZ 10/12.5MG TABLETS] 90 tablet 1     Sig: TAKE 1 TABLET BY MOUTH DAILY       Last refill date: 04/06/23      Last office visit:09/20/23       No future appointments.

## 2023-11-09 ENCOUNTER — HOSPITAL ENCOUNTER (OUTPATIENT)
Age: 63
Discharge: HOME | End: 2023-11-09
Payer: COMMERCIAL

## 2023-11-09 VITALS
DIASTOLIC BLOOD PRESSURE: 87 MMHG | BODY MASS INDEX: 37.13 KG/M2 | SYSTOLIC BLOOD PRESSURE: 155 MMHG | OXYGEN SATURATION: 97 % | RESPIRATION RATE: 20 BRPM | WEIGHT: 245 LBS | TEMPERATURE: 98 F | HEIGHT: 68 IN | HEART RATE: 91 BPM

## 2023-11-09 DIAGNOSIS — M54.89 BACK PAIN WITHOUT SCIATICA: Primary | ICD-10-CM

## 2023-11-09 PROCEDURE — 99213 OFFICE O/P EST LOW 20 MIN: CPT

## 2023-11-09 PROCEDURE — 99212 OFFICE O/P EST SF 10 MIN: CPT

## 2023-11-09 NOTE — ED INITIAL ASSESSMENT (HPI)
Since Friday, c/o intermittent mid low back pain upon waking or \"not moving for awhile'. Pain improves during the day as she moves. No pain meds taken. Denies injury or fall.

## 2023-11-09 NOTE — DISCHARGE INSTRUCTIONS
You may benefit from increasing water intake (setting alarms throughout the day can help remind you to drink more water)    You may benefit from taking Ibuprofen (600mg) or Aleve (440-500mg) nightly to decrease inflammation that may be occurring due to arthritis while you sleep (take with food or after eating to lessen GI upset)    You may benefit from rotating your mattress or investing in a new mattress if 8 years old    Keep your upcoming appointment with your care team next week

## 2023-11-16 ENCOUNTER — OFFICE VISIT (OUTPATIENT)
Dept: FAMILY MEDICINE CLINIC | Facility: CLINIC | Age: 63
End: 2023-11-16
Payer: COMMERCIAL

## 2023-11-16 VITALS
OXYGEN SATURATION: 97 % | RESPIRATION RATE: 20 BRPM | WEIGHT: 261 LBS | BODY MASS INDEX: 39.56 KG/M2 | SYSTOLIC BLOOD PRESSURE: 140 MMHG | HEART RATE: 100 BPM | DIASTOLIC BLOOD PRESSURE: 90 MMHG | HEIGHT: 68 IN

## 2023-11-16 DIAGNOSIS — M54.50 CHRONIC MIDLINE LOW BACK PAIN WITHOUT SCIATICA: Primary | ICD-10-CM

## 2023-11-16 DIAGNOSIS — L72.3 SEBACEOUS CYST: ICD-10-CM

## 2023-11-16 DIAGNOSIS — G89.29 CHRONIC MIDLINE LOW BACK PAIN WITHOUT SCIATICA: Primary | ICD-10-CM

## 2023-11-16 PROCEDURE — 99214 OFFICE O/P EST MOD 30 MIN: CPT | Performed by: PHYSICIAN ASSISTANT

## 2023-11-16 PROCEDURE — 3077F SYST BP >= 140 MM HG: CPT | Performed by: PHYSICIAN ASSISTANT

## 2023-11-16 PROCEDURE — 3008F BODY MASS INDEX DOCD: CPT | Performed by: PHYSICIAN ASSISTANT

## 2023-11-16 PROCEDURE — 3080F DIAST BP >= 90 MM HG: CPT | Performed by: PHYSICIAN ASSISTANT

## 2023-11-16 RX ORDER — NAPROXEN 500 MG/1
500 TABLET ORAL 2 TIMES DAILY WITH MEALS
Qty: 30 TABLET | Refills: 0 | Status: SHIPPED | OUTPATIENT
Start: 2023-11-16

## 2023-11-16 RX ORDER — METHOCARBAMOL 750 MG/1
750 TABLET, FILM COATED ORAL 3 TIMES DAILY
Qty: 15 TABLET | Refills: 0 | Status: SHIPPED | OUTPATIENT
Start: 2023-11-16

## 2023-11-16 NOTE — PROGRESS NOTES
Subjective:   Patient ID: Julian Negrete is a 58year old female. Pain  Associated symptoms include myalgias. Pertinent negatives include no abdominal pain, arthralgias, chest pain, chills, coughing, diaphoresis, fever, nausea, neck pain, numbness, vomiting or weakness. Patient presents for follow up of back pain  Was seen in  last week, no xray done, no meds given  Pain located low back at midline and slightly to the right  Present in the morning upon waking, better throughout the day  Feels like a stiffness, sometimes a sharp pain in the night  Sleeps on her side mostly  No sciatica symptoms  No leg weakness    Also following up on sebaceous cyst of back  Better than before but not completely resolved      History/Other:   Review of Systems   Constitutional:  Negative for chills, diaphoresis and fever. Respiratory:  Negative for cough, shortness of breath and wheezing. Cardiovascular:  Negative for chest pain and palpitations. Gastrointestinal:  Negative for abdominal pain, diarrhea, nausea and vomiting. Genitourinary:  Negative for dysuria, flank pain, frequency, hematuria and urgency. Musculoskeletal:  Positive for back pain and myalgias. Negative for arthralgias, gait problem and neck pain. Neurological:  Negative for weakness and numbness. Psychiatric/Behavioral:  Negative for sleep disturbance. Current Outpatient Medications   Medication Sig Dispense Refill    lisinopril-hydroCHLOROthiazide 10-12.5 MG Oral Tab Take 1 tablet by mouth daily. 90 tablet 1    Omega-3 1000 MG Oral Cap Take by mouth. Cholecalciferol (VITAMIN D) 1000 units Oral Tab Take by mouth. Allergies:No Known Allergies    Objective:   Physical Exam  Vitals and nursing note reviewed. Constitutional:       Appearance: She is well-developed. Cardiovascular:      Rate and Rhythm: Normal rate and regular rhythm. Heart sounds: Normal heart sounds.    Pulmonary:      Effort: Pulmonary effort is normal. Breath sounds: Normal breath sounds. No wheezing or rales. Musculoskeletal:      Thoracic back: Spasms (left paraspinal muscles) present. Lumbar back: Spasms (lumbar paraspinal muscles, rigth worse than left) present. No tenderness. Normal range of motion. Negative right straight leg raise test and negative left straight leg raise test.   Skin:     General: Skin is warm and dry. Neurological:      Mental Status: She is alert. Sensory: No sensory deficit. Assessment & Plan:   1. Chronic midline low back pain without sciatica  Patient has muscle spasms of back but given her age likely has arthritis. Will check xray to evaluate further. Will treat with naproxen and muscle relaxer as directed. Apply heat, stretch as discussed, use topical patches if needed. Follow up if not soon better. - XR LUMBAR SPINE (MIN 4 VIEWS) (CPT=72110); Future  - naproxen 500 MG Oral Tab; Take 1 tablet (500 mg total) by mouth 2 (two) times daily with meals. Dispense: 30 tablet; Refill: 0  - methocarbamol 750 MG Oral Tab; Take 1 tablet (750 mg total) by mouth 3 (three) times daily. Dispense: 15 tablet; Refill: 0    2. Sebaceous cyst  Better than before. No active infection. Dicussed definitive management with excision through dermatology in future if needed. Will monitor for now.

## 2023-12-19 ENCOUNTER — HOSPITAL ENCOUNTER (OUTPATIENT)
Dept: GENERAL RADIOLOGY | Age: 63
Discharge: HOME | End: 2023-12-19
Attending: PHYSICIAN ASSISTANT
Payer: COMMERCIAL

## 2023-12-19 DIAGNOSIS — G89.29 CHRONIC MIDLINE LOW BACK PAIN WITHOUT SCIATICA: ICD-10-CM

## 2023-12-19 DIAGNOSIS — M54.50 CHRONIC MIDLINE LOW BACK PAIN WITHOUT SCIATICA: ICD-10-CM

## 2023-12-19 PROCEDURE — 72110 X-RAY EXAM L-2 SPINE 4/>VWS: CPT | Performed by: PHYSICIAN ASSISTANT

## 2024-01-31 ENCOUNTER — TELEPHONE (OUTPATIENT)
Dept: PHYSICAL THERAPY | Facility: HOSPITAL | Age: 64
End: 2024-01-31

## 2024-02-02 ENCOUNTER — OFFICE VISIT (OUTPATIENT)
Dept: PHYSICAL THERAPY | Age: 64
End: 2024-02-02
Attending: PHYSICIAN ASSISTANT
Payer: COMMERCIAL

## 2024-02-02 DIAGNOSIS — G89.29 CHRONIC MIDLINE LOW BACK PAIN WITHOUT SCIATICA: Primary | ICD-10-CM

## 2024-02-02 DIAGNOSIS — M54.50 CHRONIC MIDLINE LOW BACK PAIN WITHOUT SCIATICA: Primary | ICD-10-CM

## 2024-02-02 PROCEDURE — 97161 PT EVAL LOW COMPLEX 20 MIN: CPT

## 2024-02-02 PROCEDURE — 97110 THERAPEUTIC EXERCISES: CPT

## 2024-02-02 NOTE — PATIENT INSTRUCTIONS
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 She subsequently completed her Doctor of Physical Therapy from [employer] in 2013. Monique’s experience is primarily with orthopedics, but also has experience in pediatrics as well.     Monique has been a certified provider of the [employer] instrument-assisted soft tissue mobilization since 2015 & has further earned the title of [employer] Specialist in 2020. Monique is continuing her passion for learning by pursuing training through the [facility] to effectively treat patients with pelvic floor related disorders. 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    Monique thrives on treating patients with empathy & compassion to provide the individualized care that all patients need & deserve. Monique aims to empower patients with the tools to feel in charge of their recovery, knowing they can advocate for themselves & achieve maximum success when they understand the treatments that work best for them.    When Monique is not at work, she enjoys exercising with [employer] classes, scenic walks outside, baking, & traveling with her family.     Monique is accepting new patients at the [facility]. To schedule or change an appointment, call [phone]. To speak with Monique, call [phone] or message on Teikon.                    Access Code: [email or web address]  URL: [email or web address]  Date: 02/02/2024  Prepared by: Monique Lin    Exercises  - Hooklying Isometric Hip Abduction with Belt  - 1 x daily - 3-4 x weekly - 1-2 sets - 10 reps - 10 sec hold  - Supine Piriformis  Stretch with Foot on Ground  - 1-2 x daily - 7 x weekly - 3 sets - 30 sec hold  - Seated Piriformis Stretch  - 1-2 x daily - 7 x weekly - 3 sets - 30 sec hold

## 2024-02-02 NOTE — PROGRESS NOTES
SPINE EVALUATION:     Diagnosis:   Chronic midline low back pain without sciatica (M54.50,G89.29)      Referring Provider: Kary  Date of Evaluation:    2/2/2024    Precautions:  None Next MD visit:   none scheduled  Date of Surgery: n/a     PATIENT SUMMARY   Breana Banks is a 63 year old female who presents to therapy today with complaints of low back stiffness/ tightness, pain across low back possibly more on the R. Pt has self-limited normal ADLs/ mobility out of concern of symptoms recurring & would like to be able to resume PLOF without fear of pain increasing. Denies referred pain & N/T.    Hx: Pt reports occasionally having LBP in the past on/ off which would resolved on its own - typically in the last few years with overdoing it such as with working in the yard, but pain started to increase around November 2023 (unable to recall MORGAN) even bothering her with trying to sleep & pt ended up going to [facility]. Was given muscle relaxer & additional medication from referring PT provider which did help pt's symptoms. Pain symptoms have mostly resolved, though has not been engaging in PLOF activities mostly out of fear. Ordered SI belt & does feel it helps; wears for longer days.    Pt describes pain level at worst 8/10 when pt went to UC, at worst current 2/10. Used heat previously but not now.  Current functional limitations include bending forward to don socks, standing & bending for work duties, self-limited ADLs 2/2 fear of pain.    Breana describes prior level of function: unlimited. Pt goals include to be back to normal walking routine.  Past medical history was reviewed with Breana. Significant findings include  has a past medical history of Acute upper respiratory infections of unspecified site, Asymptomatic postmenopausal status (age-related) (natural), Body piercing (Ears pierced), Essential hypertension, benign, Localized superficial swelling, mass, or lump, Normal delivery (1/1/96), Streptococcal sore  throat, and Undiagnosed cardiac murmurs.    XR LUMBAR SPINE (MIN 4 VIEWS) (CPT=72110)    Result Date: 12/19/2023  CONCLUSION:  1. There is degenerative change of facets at L4-L5 with mild anterolisthesis. 2. Mild degenerative disc disease is noted at all levels in lumbar spine.   LOCATION:  [facility]   Dictated by (CST): Win Hardy MD on 12/19/2023 at 1:52 PM     Finalized by (CST): Win Hardy MD on 12/19/2023 at 1:53 PM         ASSESSMENT  Breana presents to physical therapy evaluation with primary c/o low back stiffness/ tightness, pain across low back possibly more on the R. The results of the objective tests and measures show impairments in posture, ROM, strength, joint mobility, flexibility, gait, balance. Functional deficits include but are not limited to bending forward to don socks, standing & bending for work duties, self-limited ADLs 2/2 fear of pain. Signs and symptoms are consistent with diagnosis of Chronic midline low back pain without sciatica (M54.50,G89.29). Pt and PT discussed evaluation findings, pathology, POC and HEP. Pt voiced understanding and performs HEP correctly without reported pain. Skilled Physical Therapy is medically necessary to address the above impairments and reach functional goals.     OBJECTIVE:   Observation/Posture: increased lordosis upper lumbar/ lower thoracic; B pes planus & genu valgum  Neuro Screen: defer    Lumbar AROM: (* denotes performed with pain)  Flexion: Min restricted  Extension: Mod restricted  Sidebending: R Mod restricted; L Mod restricted  Rotation: R Min restricted; L Min restricted    Hip PROM:  Hip IR: Min restricted B  Hip ER: WNL B    Accessory motion: hypomob central PA thoracic spine spring testing (neutral to mild relief noted), WNL though with + joint signs central PA lumbar spine spring testing  Palpation: no TTP B PSIS, no STRs or TTP along B T/L PSM/ glutes/ piriformis    Strength: (* denotes performed with pain)  LE   Hip Abduction: R 3/5; L  3/5  Hip Extension: R 3/5; L 3/5      Flexibility:   LE   Hamstrings: R Min restricted; L Min restricted  Piriformis: R Mod restricted; L Mod restricted  Quads: R Mod restricted; L Min restricted     Special tests:   Pelvic alignment: mild obliquity (RLE \"longer\" in supine & = in long sitting)    Gait: pt ambulates on level ground with decreased trunk rotation, mild lateral trunk lean R/L  Balance: SLS R 7 sec, L 10 sec with hip/ pelvic drop with both    Today’s Treatment and Response:   Pt education was provided on exam findings, treatment diagnosis, treatment plan, expectations, and prognosis. Pt was also provided recommendations for activity modifications, possible soreness after evaluation, modalities as needed [ice/heat], pain science education , detrimental fear avoidance behaviors, and importance of remaining active. Educated in log rolling for bed mobility.    Patient was instructed in and issued a HEP for:  Access Code: [email or web address]  URL: [email or web address]  Date: 02/02/2024  Prepared by: Monique Lin    Exercises  - Hooklying Isometric Hip Abduction with Belt  - 1 x daily - 3-4 x weekly - 1-2 sets - 10 reps - 10 sec hold (Green TheraBand)  - Supine Piriformis Stretch with Foot on Ground  - 1-2 x daily - 7 x weekly - 3 sets - 30 sec hold  - Seated Piriformis Stretch  - 1-2 x daily - 7 x weekly - 3 sets - 30 sec hold    Charges: PT Eval Low Complexity, Therex x 1      Total Timed Treatment: 12 min     Total Treatment Time: 45 min     PLAN OF CARE:    Goals: (to be met in 8 visits)  Pt will demonstrate good understanding of proper posture & body mechanics to decrease pain & improve spinal safety for ability to resume ADLs without self-limitations or fear of pain  Pt will improve lumbar spine AROM flexion to WNL to allow increase ease with bending forward to don socks  Pt will improve B hip abduction & extension strength to at least 4-/5 for improved tolerance to standing & bending for work duties  Pt will  be independent and compliant with comprehensive HEP to maintain progress achieved in PT    Frequency / Duration: Patient will be seen for 1-2 x/week or a total of 8 visits over a 90 day period. Treatment will include: Manual Therapy, Neuromuscular Re-education, Self-Care Home Management, Therapeutic Activities, Therapeutic Exercise, Home Exercise Program instruction, and Modalities to include: Electrical stimulation (unattended)    Education or treatment limitation: None  Rehab Potential:excellent    Oswestry Disability Index Score  Score: 2 % (1/27/2024  9:09 AM)    Patient/Family/Caregiver was advised of these findings, precautions, and treatment options and has agreed to actively participate in planning and for this course of care.    Thank you for your referral. Please co-sign or sign and return this letter via fax as soon as possible to [phone]. If you have any questions, please contact me at Dept: [phone]    Sincerely,  Electronically signed by therapist: Monique Lin, PT    Physician's certification required: Yes  I certify the need for these services furnished under this plan of treatment and while under my care.    X___________________________________________________ Date____________________    Certification From: 2/2/2024  To:5/2/2024

## 2024-02-14 ENCOUNTER — APPOINTMENT (OUTPATIENT)
Dept: PHYSICAL THERAPY | Age: 64
End: 2024-02-14
Attending: PHYSICIAN ASSISTANT
Payer: COMMERCIAL

## 2024-02-16 ENCOUNTER — APPOINTMENT (OUTPATIENT)
Dept: PHYSICAL THERAPY | Age: 64
End: 2024-02-16
Attending: PHYSICIAN ASSISTANT
Payer: COMMERCIAL

## 2024-02-19 ENCOUNTER — APPOINTMENT (OUTPATIENT)
Dept: PHYSICAL THERAPY | Age: 64
End: 2024-02-19
Attending: PHYSICIAN ASSISTANT
Payer: COMMERCIAL

## 2024-02-21 ENCOUNTER — APPOINTMENT (OUTPATIENT)
Dept: PHYSICAL THERAPY | Age: 64
End: 2024-02-21
Attending: PHYSICIAN ASSISTANT
Payer: COMMERCIAL

## 2024-03-04 ENCOUNTER — TELEPHONE (OUTPATIENT)
Dept: PHYSICAL THERAPY | Age: 64
End: 2024-03-04

## 2024-03-04 ENCOUNTER — TELEPHONE (OUTPATIENT)
Dept: PHYSICAL THERAPY | Facility: HOSPITAL | Age: 64
End: 2024-03-04

## 2024-03-14 ENCOUNTER — TELEPHONE (OUTPATIENT)
Dept: PHYSICAL THERAPY | Facility: HOSPITAL | Age: 64
End: 2024-03-14

## 2024-04-06 DIAGNOSIS — I10 ESSENTIAL HYPERTENSION WITH GOAL BLOOD PRESSURE LESS THAN 140/90: ICD-10-CM

## 2024-04-08 ENCOUNTER — TELEPHONE (OUTPATIENT)
Dept: PHYSICAL THERAPY | Age: 64
End: 2024-04-08

## 2024-04-08 RX ORDER — LISINOPRIL AND HYDROCHLOROTHIAZIDE 12.5; 1 MG/1; MG/1
1 TABLET ORAL DAILY
Qty: 90 TABLET | Refills: 0 | Status: SHIPPED | OUTPATIENT
Start: 2024-04-08

## 2024-04-08 NOTE — TELEPHONE ENCOUNTER
A refill request was received for:  Requested Prescriptions     Pending Prescriptions Disp Refills    LISINOPRIL-HYDROCHLOROTHIAZIDE 10-12.5 MG Oral Tab [Pharmacy Med Name: LISINOPRIL-HCTZ 10/12.5MG TABLETS] 90 tablet 1     Sig: TAKE 1 TABLET BY MOUTH DAILY       Last refill date:10/13/23  Last office visit:11/16/23    Future Appointments   Date Time Provider Department Center   4/12/2024  8:00 AM Monique Lin, PT [facility]   4/16/2024  3:45 PM Monique Lin, PT [facility]   4/19/2024  8:00 AM Monique Lin, PT [facility]   4/22/2024  3:00 PM Monique Lin, PT [facility]

## 2024-04-12 ENCOUNTER — OFFICE VISIT (OUTPATIENT)
Dept: PHYSICAL THERAPY | Age: 64
End: 2024-04-12
Attending: FAMILY MEDICINE
Payer: COMMERCIAL

## 2024-04-12 PROCEDURE — 97110 THERAPEUTIC EXERCISES: CPT

## 2024-04-12 NOTE — PATIENT INSTRUCTIONS
Access Code: [email or web address]  URL: [email or web address]  Date: 04/12/2024  Prepared by: Monique Lin    Exercises  - Hooklying Active Hamstring Stretch  - 1 x daily - 7 x weekly - 1 sets - 5-10 reps - 10 sec hold  - Supine Bridge  - 1 x daily - 7 x weekly - 1-3 sets - 10 reps - 1-2 sec hold  - Clamshell  - 1 x daily - 7 x weekly - 1-3 sets - 10 reps - 1-2 sec hold  - Sidelying Reverse Clamshell  - 1 x daily - 7 x weekly - 1-3 sets - 10 reps - 1-2 sec hold  - Sidelying Hip Abduction  - 1 x daily - 7 x weekly - 1-3 sets - 10 reps - 1-2 sec hold  - Supine Lower Trunk Rotation  - 1 x daily - 7 x weekly - 5 sec hold - 1-3 minutes

## 2024-04-16 ENCOUNTER — APPOINTMENT (OUTPATIENT)
Dept: PHYSICAL THERAPY | Age: 64
End: 2024-04-16
Attending: FAMILY MEDICINE
Payer: COMMERCIAL

## 2024-04-19 ENCOUNTER — APPOINTMENT (OUTPATIENT)
Dept: PHYSICAL THERAPY | Age: 64
End: 2024-04-19
Payer: COMMERCIAL

## 2024-04-19 ENCOUNTER — TELEPHONE (OUTPATIENT)
Dept: PHYSICAL THERAPY | Facility: HOSPITAL | Age: 64
End: 2024-04-19

## 2024-04-22 ENCOUNTER — OFFICE VISIT (OUTPATIENT)
Dept: PHYSICAL THERAPY | Age: 64
End: 2024-04-22
Attending: FAMILY MEDICINE
Payer: COMMERCIAL

## 2024-04-22 PROCEDURE — 97110 THERAPEUTIC EXERCISES: CPT

## 2024-04-22 NOTE — PROGRESS NOTES
Diagnosis:   Chronic midline low back pain without sciatica (M54.50,G89.29)      Referring Provider: Kary  Date of Evaluation:    2/2/2024    Precautions:  None Next MD visit:   none scheduled  Date of Surgery: n/a   Insurance Primary/Secondary: [place] / N/A     # Auth Visits: no visit limit, no auth             Discharge Summary  Pt has attended 3 visits in Physical Therapy.    Subjective: Pt reports PT provided her with the confidence to do more, the ex's have helped.     Pain: current 0/10      Objective: See Flowsheet for details  4/22/2024  Lumbar AROM: (* denotes performed with pain)  Flexion: WNL no pain      4/12/2024  Observation/Posture: increased lordosis upper lumbar/ lower thoracic; B pes planus & genu valgum    Lumbar AROM: (* denotes performed with pain)  Flexion: Min restricted  Extension: Mod restricted  Sidebending: R Mod restricted; L Mod restricted  Rotation: R Min restricted; L Min restricted    Hip PROM:  Hip IR: Min restricted B  Hip ER: WNL B    Strength: (* denotes performed with pain)  LE   Hip Abduction: R 3/5; L 3+/5  Hip Extension: R 3/5; L 3/5      Flexibility:   LE   Hamstrings: R Min restricted; L Min restricted  Piriformis: R Mod restricted; L Mod restricted  Quads: R Min restricted; L Min restricted     Special tests:   Pelvic alignment: pelvic symmetry    Gait: pt ambulates on level ground with decreased trunk rotation, mild lateral trunk lean R  Balance: SLS R 10 sec, L 15 sec with hip/ pelvic drop with both. Increased challenge on RLE - more trunk sway & use of UE movement in attempts to remain balanced      Assessment: Pt has completed 3 visits of skilled PT & reports readiness for discharge. Discussion re: current HEP & recommended parameters for independent exercise including progressions, modifications, & proper performance w/ intensity, frequency, & duration. Pt verbalizes understanding w/ HEP & ability to self-alter as appropriate. Pt advised to contact PT if  questions/ concerns arise while performing HEP. Pt advised to follow up with referring provider and/ or PCP if symptoms increase despite adherence to HEP. Pt is aware that if symptoms recur or increase, pt may be appropriate to return to skilled PT under new POC w/ updated RX if deemed medically necessary. Pt is in agreement with discharge plans. Thank you.      Goals: (to be met in 6 visits)  Pt will demonstrate good understanding of proper posture & body mechanics to decrease pain & improve spinal safety for ability to resume ADLs without self-limitations or fear of pain - MET  Pt will improve lumbar spine AROM flexion to WNL to allow increase ease with bending forward to don socks - MET  Pt will improve B hip abduction & extension strength to at least 4-/5 for improved tolerance to standing & bending for work duties - improved  Pt will be independent and compliant with comprehensive HEP to maintain progress achieved in PT - MET    Plan: Discharge to HEP   Patient/Family/Caregiver was advised of these findings, precautions, and treatment options and has agreed to actively participate in planning and for this course of care.    Thank you for your referral. If you have any questions, please contact me at Dept: [phone].    Sincerely,  Electronically signed by therapist: Monique Lin, PT     Physician's certification required:  No  Please co-sign or sign and return this letter via fax as soon as possible to [phone].   I certify the need for these services furnished under this plan of treatment and while under my care.    X___________________________________________________ Date____________________    Certification From: 4/22/2024  To:7/21/2024     Date: 4/12/2024  TX#: 2/6  Progress Note Date: 4/22/2024    TX#: 3/6  Discharge   Therex: 45'  Re-assessment  Discussion of POC/ discharge planning  HEP review/ update  Hip Abd Vanessa 10x10\" Blue TB  Active HS stretch 3x10\" ea  Bridges x 10  Clams x 10 ea  Reverse  clams x 10 ea  S/L hip Abd x 10 ea  LTR 5\" 2x1'  HEP update ^ Therex: 42'  Discharge Instructions  HEP Review  Hip hinge with wooden dowel  Body Mechanics: lifting  -see AVS  Returning to walking, exercise  TM: lat, retro 0.5 mph x 1' ea  Shoes - connection to low back symptoms   HEP:  Access Code: [email or web address]  URL: [email or web address]  Date: 02/02/2024  Prepared by: Monique Lin    Exercises  - Hooklying Isometric Hip Abduction with Belt  - 1 x daily - 3-4 x weekly - 1-2 sets - 10 reps - 10 sec hold (Green TheraBand)  - Supine Piriformis Stretch with Foot on Ground  - 1-2 x daily - 7 x weekly - 3 sets - 30 sec hold  - Seated Piriformis Stretch  - 1-2 x daily - 7 x weekly - 3 sets - 30 sec hold      Access Code: [email or web address]  URL: [email or web address]  Date: 04/12/2024  Prepared by: Monique Lin    Exercises  - Hooklying Active Hamstring Stretch  - 1 x daily - 7 x weekly - 1 sets - 5-10 reps - 10 sec hold  - Supine Bridge  - 1 x daily - 7 x weekly - 1-3 sets - 10 reps - 1-2 sec hold  - Clamshell  - 1 x daily - 7 x weekly - 1-3 sets - 10 reps - 1-2 sec hold  - Sidelying Reverse Clamshell  - 1 x daily - 7 x weekly - 1-3 sets - 10 reps - 1-2 sec hold  - Sidelying Hip Abduction  - 1 x daily - 7 x weekly - 1-3 sets - 10 reps - 1-2 sec hold  - Supine Lower Trunk Rotation  - 1 x daily - 7 x weekly - 5 sec hold - 1-3 minutes    Charges: Therex x 3       Total Timed Treatment: 42 min  Total Treatment Time: 42 min

## 2024-04-22 NOTE — PATIENT INSTRUCTIONS
Physical Therapy Recommendations:    Lift with your legs, not your back  Keep your spine “neutral” - avoid bending with lifting  Perform stretches before you back starts hurting to “catch” it before  Breathe! “Exhale with Exertion”  Feel free to add broomstick, umbrella (anything lightweight & long enough) to practice “hip hinge” squatting form      Option to incorporate the lateral walking on the treadmill, backward walking (today we did 0.5 mph)  -1-3 minutes sideways each  -1-5 minutes backwards  Stop if you are leaning or have pain      Check [employer], [employer] for shoes  -some suggestions for brands: New Balance, Asics, [employer], [employer]ok[employer]. Want a wide enough “base of support” to provide stability especially with walking on uneven surfaces

## 2024-07-08 DIAGNOSIS — I10 ESSENTIAL HYPERTENSION WITH GOAL BLOOD PRESSURE LESS THAN 140/90: ICD-10-CM

## 2024-07-08 RX ORDER — LISINOPRIL AND HYDROCHLOROTHIAZIDE 12.5; 1 MG/1; MG/1
1 TABLET ORAL DAILY
Qty: 90 TABLET | Refills: 0 | Status: SHIPPED | OUTPATIENT
Start: 2024-07-08

## 2024-07-08 NOTE — TELEPHONE ENCOUNTER
.A refill request was received for:  Requested Prescriptions     Pending Prescriptions Disp Refills    LISINOPRIL-HYDROCHLOROTHIAZIDE 10-12.5 MG Oral Tab [Pharmacy Med Name: LISINOPRIL-HCTZ 10/12.5MG TABLETS] 90 tablet 0     Sig: TAKE 1 TABLET BY MOUTH DAILY       Last refill date:   4/8/2024    Last office visit: 9/20/2023    Follow up due:  No future appointments.

## 2024-10-07 DIAGNOSIS — I10 ESSENTIAL HYPERTENSION WITH GOAL BLOOD PRESSURE LESS THAN 140/90: ICD-10-CM

## 2024-10-07 NOTE — TELEPHONE ENCOUNTER
A refill request was received for:  Requested Prescriptions     Pending Prescriptions Disp Refills    LISINOPRIL-HYDROCHLOROTHIAZIDE 10-12.5 MG Oral Tab [Pharmacy Med Name: LISINOPRIL-HCTZ 10/12.5MG TABLETS] 90 tablet 0     Sig: TAKE 1 TABLET BY MOUTH DAILY       Last refill date:  07/08/24     Last office visit: 11/16/23      No future appointments.

## 2024-10-08 RX ORDER — LISINOPRIL/HYDROCHLOROTHIAZIDE 10-12.5 MG
1 TABLET ORAL DAILY
Qty: 90 TABLET | Refills: 0 | Status: SHIPPED | OUTPATIENT
Start: 2024-10-08

## 2024-10-22 ENCOUNTER — OFFICE VISIT (OUTPATIENT)
Dept: FAMILY MEDICINE CLINIC | Facility: CLINIC | Age: 64
End: 2024-10-22
Payer: COMMERCIAL

## 2024-10-22 VITALS
WEIGHT: 261 LBS | RESPIRATION RATE: 16 BRPM | OXYGEN SATURATION: 95 % | HEIGHT: 68 IN | TEMPERATURE: 98 F | BODY MASS INDEX: 39.56 KG/M2 | HEART RATE: 95 BPM | DIASTOLIC BLOOD PRESSURE: 80 MMHG | SYSTOLIC BLOOD PRESSURE: 130 MMHG

## 2024-10-22 DIAGNOSIS — J98.01 BRONCHOSPASM: Primary | ICD-10-CM

## 2024-10-22 PROCEDURE — 99213 OFFICE O/P EST LOW 20 MIN: CPT | Performed by: NURSE PRACTITIONER

## 2024-10-22 PROCEDURE — 3075F SYST BP GE 130 - 139MM HG: CPT | Performed by: NURSE PRACTITIONER

## 2024-10-22 PROCEDURE — 3008F BODY MASS INDEX DOCD: CPT | Performed by: NURSE PRACTITIONER

## 2024-10-22 PROCEDURE — 3079F DIAST BP 80-89 MM HG: CPT | Performed by: NURSE PRACTITIONER

## 2024-10-22 RX ORDER — ALBUTEROL SULFATE 90 UG/1
INHALANT RESPIRATORY (INHALATION)
Qty: 1 EACH | Refills: 0 | Status: SHIPPED | OUTPATIENT
Start: 2024-10-22

## 2024-10-22 RX ORDER — BENZONATATE 200 MG/1
CAPSULE ORAL
Qty: 30 CAPSULE | Refills: 0 | Status: SHIPPED | OUTPATIENT
Start: 2024-10-22

## 2024-10-22 NOTE — PROGRESS NOTES
CHIEF COMPLAINT:     Chief Complaint   Patient presents with    Cough     Nearing 2 weeks, On and off, this weekend was worse  OTC none         HPI:   Breana Banks is a 64 year old female who presents for cough for  2  +weeks.  Cough started gradually and is described as tight and deep. Patient has history of bronchitis. This is  similar to past episodes.  Cough is at times productive and is worse at night. Other triggers for the cough: talking and deep breathing.  Home treatments include: use of inhaler.      The patientis not a smoker.        Current Outpatient Medications   Medication Sig Dispense Refill    albuterol 108 (90 Base) MCG/ACT Inhalation Aero Soln 2 puffs every 6 hours as needed for cough. 1 each 0    benzonatate 200 MG Oral Cap Take up to 3 tabs per day for 10 days, as needed for cough. 30 capsule 0    LISINOPRIL-HYDROCHLOROTHIAZIDE 10-12.5 MG Oral Tab TAKE 1 TABLET BY MOUTH DAILY 90 tablet 0    naproxen 500 MG Oral Tab Take 1 tablet (500 mg total) by mouth 2 (two) times daily with meals. 30 tablet 0    methocarbamol 750 MG Oral Tab Take 1 tablet (750 mg total) by mouth 3 (three) times daily. 15 tablet 0    Omega-3 1000 MG Oral Cap Take by mouth.      Cholecalciferol (VITAMIN D) 1000 units Oral Tab Take by mouth.        Past Medical History:    Acute upper respiratory infections of unspecified site    Asymptomatic postmenopausal status (age-related) (natural)    Body piercing    Essential hypertension, benign    Localized superficial swelling, mass, or lump    Normal delivery (HCC)    Streptococcal sore throat    Undiagnosed cardiac murmurs      Social History:  Social History     Socioeconomic History    Marital status:    Tobacco Use    Smoking status: Never     Passive exposure: Never    Smokeless tobacco: Never   Vaping Use    Vaping status: Never Used   Substance and Sexual Activity    Alcohol use: Yes     Alcohol/week: 2.5 standard drinks of alcohol     Types: 3 Standard drinks or  equivalent per week     Comment: social    Drug use: No   Other Topics Concern    Caffeine Concern Yes    Exercise Yes        REVIEW OF SYSTEMS:   GENERAL: No fever or chills.  SKIN: No rashes, or other skin lesions.   EYES: Denies blurred vision or double vision.  HENT: Denies ear pain, decreased hearing, or sore throat.  Reports mild sinus congestion.  CARDIOVASCULAR: Denies chest pain or palpitations  LUNGS: Per HPI. Denies shortness of breath with exertion or rest.   GI: Denies N/V/C/D or abdominal pain.      EXAM:   /80   Pulse 95   Temp 97.7 °F (36.5 °C)   Resp 16   Ht 5' 8\" (1.727 m)   Wt 261 lb (118.4 kg)   SpO2 95%   BMI 39.68 kg/m²   GENERAL: well developed, well nourished,in no apparent distress.  Not toxic appearing.  No labored breathing  SKIN: no rashes, no suspicious lesions  EYES: Conjunctiva clear.  No scleral icterus.  HENT: Atraumatic, normocephalic.  TM's cloudy gray bilaterally.  Nostrils patent, nasal mucosa and inflamed.  No erythema of the throat.  NECK: supple, non-tender.  LUNGS: Normal respiratory rate. Normal effort.  Dry cough.  No rales or crackles. No dullness on percussion of anterior and posterior chest wall. No decreased BS.    CARDIO: RRR without murmur  LYMPH: No cervical or supraclavicular lymphadenopathy.   EXTREMITIES:  No clubbing, cyanosis, or edema.    ASSESSMENT AND PLAN:   Breana Banks is a 64 year old female who presents with:  bronchospasm and post tussive emesis.     ASSESSMENT:  Encounter Diagnosis   Name Primary?    Bronchospasm Yes       PLAN:  To use inhaler and benzonatate,   If you develop worsening, new, changing or persistent symptoms or swelling of the face, swelling and pain around the eye, worsening cough with fever, shortness of breath, chest or back pain or wheezing, neck pain/siffness, nausea/vomiting please seek immediate care at the ER as these can be signs of more serious illness requiring emergent evaluation and treatment    Meds & Refills  for this Visit:  Requested Prescriptions     Signed Prescriptions Disp Refills    albuterol 108 (90 Base) MCG/ACT Inhalation Aero Soln 1 each 0     Si puffs every 6 hours as needed for cough.    benzonatate 200 MG Oral Cap 30 capsule 0     Sig: Take up to 3 tabs per day for 10 days, as needed for cough.             The patient indicates understanding of these issues and agrees to the plan.  The patient is asked to return if sx's persist or worsen.  Go to ER for significant shortness of breath, persistent fever.

## 2025-01-08 ENCOUNTER — PATIENT MESSAGE (OUTPATIENT)
Dept: FAMILY MEDICINE CLINIC | Facility: CLINIC | Age: 65
End: 2025-01-08

## 2025-01-08 DIAGNOSIS — I10 ESSENTIAL HYPERTENSION WITH GOAL BLOOD PRESSURE LESS THAN 140/90: ICD-10-CM

## 2025-01-08 RX ORDER — LISINOPRIL AND HYDROCHLOROTHIAZIDE 10; 12.5 MG/1; MG/1
1 TABLET ORAL DAILY
Qty: 90 TABLET | Refills: 0 | Status: SHIPPED | OUTPATIENT
Start: 2025-01-08

## 2025-01-08 NOTE — TELEPHONE ENCOUNTER
Requesting   Requested Prescriptions     Pending Prescriptions Disp Refills    lisinopril-hydroCHLOROthiazide 10-12.5 MG Oral Tab 90 tablet 0     Sig: Take 1 tablet by mouth daily.       LOV: 11/16/23    Filled: 10/8/2024 #90 with 0 refills    Future Appointments   Date Time Provider Department Center   1/22/2025  8:00 AM Sweetie Preston PA-C [facility] [address]

## 2025-02-05 ENCOUNTER — OFFICE VISIT (OUTPATIENT)
Dept: FAMILY MEDICINE CLINIC | Facility: CLINIC | Age: 65
End: 2025-02-05
Payer: COMMERCIAL

## 2025-02-05 VITALS
RESPIRATION RATE: 15 BRPM | HEIGHT: 68 IN | SYSTOLIC BLOOD PRESSURE: 130 MMHG | DIASTOLIC BLOOD PRESSURE: 72 MMHG | BODY MASS INDEX: 39.4 KG/M2 | OXYGEN SATURATION: 98 % | HEART RATE: 68 BPM | WEIGHT: 260 LBS

## 2025-02-05 DIAGNOSIS — I10 ESSENTIAL HYPERTENSION WITH GOAL BLOOD PRESSURE LESS THAN 140/90: ICD-10-CM

## 2025-02-05 DIAGNOSIS — Z00.00 GENERAL MEDICAL EXAM: Primary | ICD-10-CM

## 2025-02-05 DIAGNOSIS — Z13.820 ENCOUNTER FOR OSTEOPOROSIS SCREENING IN ASYMPTOMATIC POSTMENOPAUSAL PATIENT: ICD-10-CM

## 2025-02-05 DIAGNOSIS — Z12.31 VISIT FOR SCREENING MAMMOGRAM: ICD-10-CM

## 2025-02-05 DIAGNOSIS — Z78.0 ENCOUNTER FOR OSTEOPOROSIS SCREENING IN ASYMPTOMATIC POSTMENOPAUSAL PATIENT: ICD-10-CM

## 2025-02-05 PROCEDURE — 3008F BODY MASS INDEX DOCD: CPT | Performed by: PHYSICIAN ASSISTANT

## 2025-02-05 PROCEDURE — 99214 OFFICE O/P EST MOD 30 MIN: CPT | Performed by: PHYSICIAN ASSISTANT

## 2025-02-05 PROCEDURE — 3075F SYST BP GE 130 - 139MM HG: CPT | Performed by: PHYSICIAN ASSISTANT

## 2025-02-05 PROCEDURE — 3078F DIAST BP <80 MM HG: CPT | Performed by: PHYSICIAN ASSISTANT

## 2025-02-05 RX ORDER — LISINOPRIL AND HYDROCHLOROTHIAZIDE 10; 12.5 MG/1; MG/1
1 TABLET ORAL DAILY
Qty: 90 TABLET | Refills: 3 | Status: SHIPPED | OUTPATIENT
Start: 2025-02-05

## 2025-02-05 NOTE — PROGRESS NOTES
Subjective:   Patient ID: Breana Banks is a 64 year old female.    HPI  Patient presents for follow up of hypertension  Has not been seen in over a year  Currently on lisinopril-hydrochlorothiazide 10-12.5 mg daily and tolerating well  Reports her BP is well controlled at home, tends to be more elevated in the office  No cp, sob, palpitations, edema, headache, vision changes    Due for health maintenance    Diet: well balanced  Exercise: trying to stay active  Water intake is very good  Sleep: pretty good      History/Other:   Review of Systems   Eyes:  Negative for visual disturbance.   Respiratory:  Negative for chest tightness and shortness of breath.    Cardiovascular:  Negative for palpitations and leg swelling.   Neurological:  Negative for dizziness and light-headedness.     Current Outpatient Medications   Medication Sig Dispense Refill    lisinopril-hydroCHLOROthiazide 10-12.5 MG Oral Tab Take 1 tablet by mouth daily. 90 tablet 0    Omega-3 1000 MG Oral Cap Take by mouth.      Cholecalciferol (VITAMIN D) 1000 units Oral Tab Take by mouth.       Allergies:Allergies[1]    Objective:   Physical Exam  Vitals and nursing note reviewed.   Constitutional:       Appearance: She is well-developed.   HENT:      Head: Normocephalic and atraumatic.   Eyes:      Conjunctiva/sclera: Conjunctivae normal.      Pupils: Pupils are equal, round, and reactive to light.   Cardiovascular:      Rate and Rhythm: Normal rate and regular rhythm.      Heart sounds: Normal heart sounds.   Pulmonary:      Effort: Pulmonary effort is normal.      Breath sounds: Normal breath sounds. No wheezing or rales.   Musculoskeletal:      Cervical back: Normal range of motion and neck supple.   Neurological:      Mental Status: She is alert.         Assessment & Plan:   1. Essential hypertension with goal blood pressure less than 140/90  Well controlled. Recheck labs. Continue same dose of medication, refills given. Follow up in 6-12 months.  Sooner prn.   - lisinopril-hydroCHLOROthiazide 10-12.5 MG Oral Tab; Take 1 tablet by mouth daily.  Dispense: 90 tablet; Refill: 3    2. General medical exam    - CBC With Differential With Platelet; Future  - Comp Metabolic Panel (14); Future  - Lipid Panel; Future  - TSH W Reflex To Free T4; Future  - Vitamin B12; Future  - Vitamin D; Future  - Hemoglobin A1C; Future    3. Visit for screening mammogram  - [facility] INOCENTE 2D+3D SCREENING BILAT (CPT=77067/80705); Future    4. Encounter for osteoporosis screening in asymptomatic postmenopausal patient  - XR DEXA BONE DENSITOMETRY (CPT=77080); Future             [1] No Known Allergies

## 2025-04-03 ENCOUNTER — OFFICE VISIT (OUTPATIENT)
Dept: FAMILY MEDICINE CLINIC | Facility: CLINIC | Age: 65
End: 2025-04-03
Payer: COMMERCIAL

## 2025-04-03 VITALS
SYSTOLIC BLOOD PRESSURE: 138 MMHG | BODY MASS INDEX: 39.46 KG/M2 | TEMPERATURE: 99 F | HEIGHT: 68 IN | HEART RATE: 84 BPM | DIASTOLIC BLOOD PRESSURE: 82 MMHG | OXYGEN SATURATION: 98 % | WEIGHT: 260.38 LBS | RESPIRATION RATE: 18 BRPM

## 2025-04-03 DIAGNOSIS — J06.9 VIRAL URI WITH COUGH: Primary | ICD-10-CM

## 2025-04-03 PROCEDURE — 3075F SYST BP GE 130 - 139MM HG: CPT | Performed by: NURSE PRACTITIONER

## 2025-04-03 PROCEDURE — 3008F BODY MASS INDEX DOCD: CPT | Performed by: NURSE PRACTITIONER

## 2025-04-03 PROCEDURE — 3079F DIAST BP 80-89 MM HG: CPT | Performed by: NURSE PRACTITIONER

## 2025-04-03 PROCEDURE — 99213 OFFICE O/P EST LOW 20 MIN: CPT | Performed by: NURSE PRACTITIONER

## 2025-04-03 RX ORDER — BENZONATATE 200 MG/1
200 CAPSULE ORAL 3 TIMES DAILY PRN
Qty: 20 CAPSULE | Refills: 0 | Status: SHIPPED | OUTPATIENT
Start: 2025-04-03

## 2025-04-03 NOTE — PROGRESS NOTES
CHIEF COMPLAINT:     Chief Complaint   Patient presents with    Cough     Cough and headache x 5 days        HPI:   Billy Banks is a 64 year old female who presents for upper respiratory symptoms for  5 days. Patient reports sore throat only at the beginning of sx's, congestion, cough with yellow/green colored sputum at times.  Symptoms have been worsening since onset.  Treating symptoms with OTC eds.   Associated symptoms include mild headache and fatigue as well. No measured fevers, some chills from time to time.    Current Outpatient Medications   Medication Sig Dispense Refill    benzonatate 200 MG Oral Cap Take 1 capsule (200 mg total) by mouth 3 (three) times daily as needed for cough. 20 capsule 0    lisinopril-hydroCHLOROthiazide 10-12.5 MG Oral Tab Take 1 tablet by mouth daily. 90 tablet 3    Omega-3 1000 MG Oral Cap Take by mouth.      Cholecalciferol (VITAMIN D) 1000 units Oral Tab Take by mouth.        Past Medical History:    Acute upper respiratory infections of unspecified site    Asymptomatic postmenopausal status (age-related) (natural)    Body piercing    Essential hypertension, benign    Localized superficial swelling, mass, or lump    Normal delivery (HCC)    Streptococcal sore throat    Undiagnosed cardiac murmurs      Past Surgical History:   Procedure Laterality Date          Tonsillectomy      Tubal ligation  02         Social History     Socioeconomic History    Marital status:    Tobacco Use    Smoking status: Never     Passive exposure: Never    Smokeless tobacco: Never   Vaping Use    Vaping status: Never Used   Substance and Sexual Activity    Alcohol use: Yes     Alcohol/week: 2.5 standard drinks of alcohol     Types: 3 Standard drinks or equivalent per week     Comment: social    Drug use: No   Other Topics Concern    Caffeine Concern Yes    Exercise Yes         REVIEW OF SYSTEMS:   GENERAL: feels well otherwise,   ok appetite  SKIN: no rashes or abnormal  skin lesions  HEENT: See HPI  LUNGS: denies shortness of breath or wheezing, See HPI  CARDIOVASCULAR: denies chest pain or palpitations   GI: denies N/V/C or abdominal pain  NEURO: Denies headaches    EXAM:   /82   Pulse 84   Temp 98.5 °F (36.9 °C) (Oral)   Resp 18   Ht 5' 8\" (1.727 m)   Wt 260 lb 6.4 oz (118.1 kg)   SpO2 98%   BMI 39.59 kg/m²   GENERAL: well developed, well nourished,in no apparent distress  SKIN: no rashes,no suspicious lesions  HEAD: atraumatic, normocephalic.  no tenderness on palpation of maxillary or frontal sinuses  EYES: conjunctiva clear, EOM intact  EARS: TM's pearly, no  bulging, no retraction,no  fluid, bony landmarks visualized  NOSE: Nostrils patent, clear to mucoid nasal discharge, nasal mucosa pink and moist  THROAT: Oral mucosa pink, moist. Posterior pharynx is not erythematous. no exudates. Tonsils 1/4.    NECK: Supple, non-tender  LUNGS: clear to auscultation bilaterally, no wheezes or rhonchi.  No crackles/rales, good air movement throughout. Breathing is non labored.  CARDIO: RRR without murmur  EXTREMITIES: no cyanosis, clubbing or edema        ASSESSMENT AND PLAN:   Breana Banks is a 64 year old female who presents with     ASSESSMENT:   Encounter Diagnosis   Name Primary?    Viral URI with cough Yes       PLAN: Meds as below. Discussed viral vs bacterial etiology of URIs, including pharyngitis, laryngitis, bronchitis and sinus congestion/pain. Patient was informed that antibiotics are not effective for treating viral ailments and can result in antibiotic resistence. Reviewed symptom relief measures with patient. Patient is  amenable to symptom relief measures.   Mucinex to help thin secretions.    Follow up with PCP if no improvement in 3-5 days, sooner if worsening.  If any sob/wheezing seek emergent care.Comfort care as described in Patient Instructions    Meds & Refills for this Visit:  Requested Prescriptions     Signed Prescriptions Disp Refills     benzonatate 200 MG Oral Cap 20 capsule 0     Sig: Take 1 capsule (200 mg total) by mouth 3 (three) times daily as needed for cough.       Risks, benefits, and side effects of medication explained and discussed.    There are no Patient Instructions on file for this visit.    The patient indicates understanding of these issues and agrees to the plan.  The patient is asked to return if sx's persist or worsen.

## 2025-04-08 ENCOUNTER — APPOINTMENT (OUTPATIENT)
Dept: CT IMAGING | Age: 65
End: 2025-04-08
Attending: EMERGENCY MEDICINE
Payer: COMMERCIAL

## 2025-04-08 ENCOUNTER — ANESTHESIA (OUTPATIENT)
Dept: MEDSURG UNIT | Facility: HOSPITAL | Age: 65
End: 2025-04-08
Payer: COMMERCIAL

## 2025-04-08 ENCOUNTER — HOSPITAL ENCOUNTER (INPATIENT)
Facility: HOSPITAL | Age: 65
LOS: 1 days | Discharge: OTHER INSTITUTION | End: 2025-04-09
Attending: EMERGENCY MEDICINE | Admitting: HOSPITALIST
Payer: COMMERCIAL

## 2025-04-08 ENCOUNTER — APPOINTMENT (OUTPATIENT)
Dept: CT IMAGING | Facility: HOSPITAL | Age: 65
End: 2025-04-08
Attending: EMERGENCY MEDICINE
Payer: COMMERCIAL

## 2025-04-08 ENCOUNTER — APPOINTMENT (OUTPATIENT)
Dept: GENERAL RADIOLOGY | Age: 65
End: 2025-04-08
Attending: EMERGENCY MEDICINE
Payer: COMMERCIAL

## 2025-04-08 ENCOUNTER — HOSPITAL ENCOUNTER (INPATIENT)
Facility: HOSPITAL | Age: 65
LOS: 1 days | Discharge: ANOTHER HOSPITAL | End: 2025-04-09
Attending: EMERGENCY MEDICINE | Admitting: HOSPITALIST
Payer: COMMERCIAL

## 2025-04-08 ENCOUNTER — ANESTHESIA EVENT (OUTPATIENT)
Dept: MEDSURG UNIT | Facility: HOSPITAL | Age: 65
End: 2025-04-08
Payer: COMMERCIAL

## 2025-04-08 DIAGNOSIS — I63.9 ACUTE CVA (CEREBROVASCULAR ACCIDENT) (HCC): Primary | ICD-10-CM

## 2025-04-08 PROBLEM — E87.1 HYPONATREMIA: Status: ACTIVE | Noted: 2025-04-08

## 2025-04-08 PROBLEM — E87.6 HYPOKALEMIA: Status: ACTIVE | Noted: 2025-04-08

## 2025-04-08 LAB
ALBUMIN SERPL-MCNC: 4.9 G/DL (ref 3.2–4.8)
ALBUMIN/GLOB SERPL: 1.4 {RATIO} (ref 1–2)
ALP LIVER SERPL-CCNC: 80 U/L
ALT SERPL-CCNC: 24 U/L
ANION GAP SERPL CALC-SCNC: 12 MMOL/L (ref 0–18)
ANTIBODY SCREEN: NEGATIVE
APTT PPP: 28 SECONDS (ref 23–36)
AST SERPL-CCNC: 21 U/L (ref ?–34)
ATRIAL RATE: 106 BPM
BASE EXCESS BLD CALC-SCNC: -1 MMOL/L
BASOPHILS # BLD AUTO: 0.08 X10(3) UL (ref 0–0.2)
BASOPHILS NFR BLD AUTO: 0.9 %
BILIRUB SERPL-MCNC: 0.5 MG/DL (ref 0.2–1.1)
BUN BLD-MCNC: 11 MG/DL (ref 9–23)
CALCIUM BLD-MCNC: 9.9 MG/DL (ref 8.7–10.6)
CHLORIDE SERPL-SCNC: 98 MMOL/L (ref 98–112)
CHOLEST SERPL-MCNC: 263 MG/DL (ref ?–200)
CO2 BLD-SCNC: 21 MMOL/L (ref 22–32)
CO2 SERPL-SCNC: 23 MMOL/L (ref 21–32)
CREAT BLD-MCNC: 0.83 MG/DL
EGFRCR SERPLBLD CKD-EPI 2021: 78 ML/MIN/1.73M2 (ref 60–?)
EOSINOPHIL # BLD AUTO: 0.04 X10(3) UL (ref 0–0.7)
EOSINOPHIL NFR BLD AUTO: 0.4 %
ERYTHROCYTE [DISTWIDTH] IN BLOOD BY AUTOMATED COUNT: 12.5 %
EST. AVERAGE GLUCOSE BLD GHB EST-MCNC: 123 MG/DL (ref 68–126)
GLOBULIN PLAS-MCNC: 3.4 G/DL (ref 2–3.5)
GLUCOSE BLD-MCNC: 117 MG/DL (ref 70–99)
GLUCOSE BLD-MCNC: 120 MG/DL (ref 70–99)
GLUCOSE BLD-MCNC: 135 MG/DL (ref 70–99)
GLUCOSE BLD-MCNC: 143 MG/DL (ref 70–99)
HBA1C MFR BLD: 5.9 % (ref ?–5.7)
HCO3 BLD-SCNC: 20.7 MEQ/L
HCT VFR BLD AUTO: 41.7 %
HDLC SERPL-MCNC: 76 MG/DL (ref 40–59)
HGB BLD-MCNC: 14.8 G/DL
IMM GRANULOCYTES # BLD AUTO: 0.05 X10(3) UL (ref 0–1)
IMM GRANULOCYTES NFR BLD: 0.5 %
INR BLD: 0.92 (ref 0.8–1.2)
LDLC SERPL CALC-MCNC: 161 MG/DL (ref ?–100)
LYMPHOCYTES # BLD AUTO: 2.2 X10(3) UL (ref 1–4)
LYMPHOCYTES NFR BLD AUTO: 23.9 %
MCH RBC QN AUTO: 32.5 PG (ref 26–34)
MCHC RBC AUTO-ENTMCNC: 35.5 G/DL (ref 31–37)
MCV RBC AUTO: 91.4 FL
MONOCYTES # BLD AUTO: 0.5 X10(3) UL (ref 0.1–1)
MONOCYTES NFR BLD AUTO: 5.4 %
MRSA DNA SPEC QL NAA+PROBE: NEGATIVE
NEUTROPHILS # BLD AUTO: 6.32 X10 (3) UL (ref 1.5–7.7)
NEUTROPHILS # BLD AUTO: 6.32 X10(3) UL (ref 1.5–7.7)
NEUTROPHILS NFR BLD AUTO: 68.9 %
NONHDLC SERPL-MCNC: 187 MG/DL (ref ?–130)
OSMOLALITY SERPL CALC.SUM OF ELEC: 278 MOSM/KG (ref 275–295)
P AXIS: 39 DEGREES
P-R INTERVAL: 166 MS
PCO2 BLD: 22.8 MMHG
PH BLD: 7.57 [PH]
PLATELET # BLD AUTO: 412 10(3)UL (ref 150–450)
PO2 BLD: 119 MMHG
POTASSIUM SERPL-SCNC: 3.2 MMOL/L (ref 3.5–5.1)
PROT SERPL-MCNC: 8.3 G/DL (ref 5.7–8.2)
PROTHROMBIN TIME: 12.2 SECONDS (ref 11.6–14.8)
Q-T INTERVAL: 360 MS
QRS DURATION: 96 MS
QTC CALCULATION (BEZET): 478 MS
R AXIS: -44 DEGREES
RBC # BLD AUTO: 4.56 X10(6)UL
RH BLOOD TYPE: POSITIVE
RH BLOOD TYPE: POSITIVE
SAO2 % BLD: 99 %
SODIUM SERPL-SCNC: 133 MMOL/L (ref 136–145)
T AXIS: 71 DEGREES
TRIGL SERPL-MCNC: 148 MG/DL (ref 30–149)
TROPONIN I SERPL HS-MCNC: 4 NG/L
TSI SER-ACNC: 2.94 UIU/ML (ref 0.55–4.78)
VENTRICULAR RATE: 106 BPM
VLDLC SERPL CALC-MCNC: 29 MG/DL (ref 0–30)
WBC # BLD AUTO: 9.2 X10(3) UL (ref 4–11)

## 2025-04-08 PROCEDURE — 0042T CT STROKE (DAWN) CTA BRAIN/CTA NECK+PERF(CPT=70496/70498/0042T): CPT | Performed by: EMERGENCY MEDICINE

## 2025-04-08 PROCEDURE — 99291 CRITICAL CARE FIRST HOUR: CPT | Performed by: NEUROLOGICAL SURGERY

## 2025-04-08 PROCEDURE — 70498 CT ANGIOGRAPHY NECK: CPT | Performed by: EMERGENCY MEDICINE

## 2025-04-08 PROCEDURE — 99291 CRITICAL CARE FIRST HOUR: CPT | Performed by: INTERNAL MEDICINE

## 2025-04-08 PROCEDURE — 3E03317 INTRODUCTION OF OTHER THROMBOLYTIC INTO PERIPHERAL VEIN, PERCUTANEOUS APPROACH: ICD-10-PCS | Performed by: EMERGENCY MEDICINE

## 2025-04-08 PROCEDURE — 70496 CT ANGIOGRAPHY HEAD: CPT | Performed by: EMERGENCY MEDICINE

## 2025-04-08 PROCEDURE — 99292 CRITICAL CARE ADDL 30 MIN: CPT | Performed by: INTERNAL MEDICINE

## 2025-04-08 PROCEDURE — 70450 CT HEAD/BRAIN W/O DYE: CPT | Performed by: EMERGENCY MEDICINE

## 2025-04-08 PROCEDURE — 71045 X-RAY EXAM CHEST 1 VIEW: CPT | Performed by: EMERGENCY MEDICINE

## 2025-04-08 RX ORDER — FAMOTIDINE 10 MG/ML
20 INJECTION, SOLUTION INTRAVENOUS ONCE AS NEEDED
Status: ACTIVE | OUTPATIENT
Start: 2025-04-08 | End: 2025-04-08

## 2025-04-08 RX ORDER — HYDRALAZINE HYDROCHLORIDE 20 MG/ML
10 INJECTION INTRAMUSCULAR; INTRAVENOUS EVERY 2 HOUR PRN
Status: DISCONTINUED | OUTPATIENT
Start: 2025-04-08 | End: 2025-04-09

## 2025-04-08 RX ORDER — FAMOTIDINE 10 MG/ML
20 INJECTION, SOLUTION INTRAVENOUS ONCE AS NEEDED
Status: DISCONTINUED | OUTPATIENT
Start: 2025-04-08 | End: 2025-04-08

## 2025-04-08 RX ORDER — ATORVASTATIN CALCIUM 40 MG/1
40 TABLET, FILM COATED ORAL NIGHTLY
Status: DISCONTINUED | OUTPATIENT
Start: 2025-04-08 | End: 2025-04-09

## 2025-04-08 RX ORDER — ONDANSETRON 2 MG/ML
4 INJECTION INTRAMUSCULAR; INTRAVENOUS EVERY 6 HOURS PRN
Status: DISCONTINUED | OUTPATIENT
Start: 2025-04-08 | End: 2025-04-09

## 2025-04-08 RX ORDER — LABETALOL HYDROCHLORIDE 5 MG/ML
10 INJECTION, SOLUTION INTRAVENOUS EVERY 10 MIN PRN
Status: DISCONTINUED | OUTPATIENT
Start: 2025-04-08 | End: 2025-04-09

## 2025-04-08 RX ORDER — ACETAMINOPHEN 325 MG/1
650 TABLET ORAL EVERY 4 HOURS PRN
Status: DISCONTINUED | OUTPATIENT
Start: 2025-04-08 | End: 2025-04-09

## 2025-04-08 RX ORDER — METOCLOPRAMIDE HYDROCHLORIDE 5 MG/ML
10 INJECTION INTRAMUSCULAR; INTRAVENOUS EVERY 8 HOURS PRN
Status: DISCONTINUED | OUTPATIENT
Start: 2025-04-08 | End: 2025-04-09

## 2025-04-08 RX ORDER — ONDANSETRON 2 MG/ML
4 INJECTION INTRAMUSCULAR; INTRAVENOUS EVERY 4 HOURS PRN
Status: DISCONTINUED | OUTPATIENT
Start: 2025-04-08 | End: 2025-04-08 | Stop reason: HOSPADM

## 2025-04-08 RX ORDER — METHYLPREDNISOLONE SODIUM SUCCINATE 125 MG/2ML
125 INJECTION INTRAMUSCULAR; INTRAVENOUS ONCE AS NEEDED
Status: DISCONTINUED | OUTPATIENT
Start: 2025-04-08 | End: 2025-04-08

## 2025-04-08 RX ORDER — DIPHENHYDRAMINE HYDROCHLORIDE 50 MG/ML
50 INJECTION, SOLUTION INTRAMUSCULAR; INTRAVENOUS ONCE AS NEEDED
Status: ACTIVE | OUTPATIENT
Start: 2025-04-08 | End: 2025-04-08

## 2025-04-08 RX ORDER — SODIUM CHLORIDE 9 MG/ML
INJECTION, SOLUTION INTRAVENOUS CONTINUOUS
Status: DISCONTINUED | OUTPATIENT
Start: 2025-04-08 | End: 2025-04-08

## 2025-04-08 RX ORDER — ACETAMINOPHEN 650 MG/1
650 SUPPOSITORY RECTAL EVERY 4 HOURS PRN
Status: DISCONTINUED | OUTPATIENT
Start: 2025-04-08 | End: 2025-04-09

## 2025-04-08 RX ORDER — METHYLPREDNISOLONE SODIUM SUCCINATE 125 MG/2ML
125 INJECTION INTRAMUSCULAR; INTRAVENOUS ONCE AS NEEDED
Status: ACTIVE | OUTPATIENT
Start: 2025-04-08 | End: 2025-04-08

## 2025-04-08 RX ORDER — LIDOCAINE HYDROCHLORIDE AND EPINEPHRINE 10; 10 MG/ML; UG/ML
INJECTION, SOLUTION INFILTRATION; PERINEURAL
Status: COMPLETED
Start: 2025-04-08 | End: 2025-04-08

## 2025-04-08 RX ORDER — DIPHENHYDRAMINE HYDROCHLORIDE 50 MG/ML
50 INJECTION, SOLUTION INTRAMUSCULAR; INTRAVENOUS ONCE AS NEEDED
Status: DISCONTINUED | OUTPATIENT
Start: 2025-04-08 | End: 2025-04-08

## 2025-04-08 NOTE — ANESTHESIA PROCEDURE NOTES
Arterial Line    Date/Time: 4/8/2025 2:30 PM    Performed by: Omer Spangler MD  Authorized by: Omer Spangler MD    General Information and Staff    Procedure Start:  4/8/2025 2:30 PM  Procedure End:  4/8/2025 2:40 PM  Anesthesiologist:  Omer Spangler MD  Performed By:  Anesthesiologist  Patient Location:  ICU  Indication: continuous blood pressure monitoring and blood sampling needed    Site Identification: real time ultrasound guided and surface landmarks    Preanesthetic Checklist: 2 patient identifiers, IV checked, risks and benefits discussed, monitors and equipment checked, pre-op evaluation, timeout performed, anesthesia consent and sterile technique used    Procedure Details    Catheter Size:  20 G  Catheter Length:  1 and 3/4 inch  Catheter Type:  Arrow  Seldinger Technique?: Yes    Laterality:  Left  Site:  Radial artery  Site Prep: chlorhexidine    Line Secured:  Tape and Tegaderm    Assessment    Events: patient tolerated procedure well with no complications      Medications  4/8/2025 2:30 PM      Additional Comments

## 2025-04-08 NOTE — PROGRESS NOTES
Dr. Saldivar and MARIA TERESA instructed me to get patient up to ICU immediately. ER MD aware. [facility]

## 2025-04-08 NOTE — SIGNIFICANT EVENT
Stroke Alert initiated ED  Last Know Normal at 0730  Pre-morbid MRS 0  Initial NIHSS 0  upon arrival to .       Patient accompanied to CT dept  Repeat NIHSS completed back in ED room    NIH Stroke Scale  1a.  Level of consciousness: 0   1b. LOC questions:  0   1c. LOC commands: 0   2.  Best Gaze: 0   3. Visual: 0   4. Facial Palsy: 0   5a. Motor left arm: 0   5b.  Motor right arm: 0   6a. Motor left le   6b.  Motor right le   7. Limb Ataxia: 0   8.  Sensory: 0   9. Best Language:  0   10. Dysarthria: 1   11. Extinction and Inattention: 0     Total:   2           Patient previously received TNK at 1120, at [facility] ED.   Dr. Saldivar notified via perfect serve of CT scan findings reported by Dr. Donohue and Bassam radiologist. No new orders received.   Case discussed with Dr Donohue, ED and Dr. Art, [facility].     Of note: Patient arrival from [facility] ER, TNK given at 1120, 2025.  Patient previously with slurred speech, left facial droop, right eye floaters. Pt initial NIHSS 0 at [facility], and post CT scan NIHSS 2. Advised to patient the stay in CNICU, spouse at bedside, answered all questions, and they had no further questions.         Please refer to the Stroke Data Flowsheets for additional information and Stroke Alert response times.

## 2025-04-08 NOTE — ED PROVIDER NOTES
Patient Seen in: [facility] In [place]      History     Chief Complaint   Patient presents with    Lightheadedness     Stated Complaint: lightheaded    Subjective:   The history is provided by the patient and the spouse.         64-year-old female with history of HTN and obesity presents to the ER with complaints of floaters in her right peripheral vision that have subsided with associated left facial droop and mild dysarthria, and dry mouth.  Denies any headache, neck pain, back pain, chest pain, extremity weakness, or paresthesias.  No prior episodes.  Patient says she woke up this morning at 6:00 AM and felt fine, symptom onset around 8:00 AM.    Objective:     Past Medical History:    Acute upper respiratory infections of unspecified site    Asymptomatic postmenopausal status (age-related) (natural)    Body piercing    Essential hypertension, benign    Localized superficial swelling, mass, or lump    Normal delivery (HCC)    Streptococcal sore throat    Undiagnosed cardiac murmurs              Past Surgical History:   Procedure Laterality Date          Tonsillectomy      Tubal ligation  02                Social History     Socioeconomic History    Marital status:    Tobacco Use    Smoking status: Never     Passive exposure: Never    Smokeless tobacco: Never   Vaping Use    Vaping status: Never Used   Substance and Sexual Activity    Alcohol use: Yes     Alcohol/week: 2.5 standard drinks of alcohol     Types: 3 Standard drinks or equivalent per week     Comment: social    Drug use: No   Other Topics Concern    Caffeine Concern Yes    Exercise Yes                  Physical Exam     ED Triage Vitals   BP 25 1032 (!) 177/86   Pulse 25 1032 108   Resp 25 1032 20   Temp 25 1032 99.2 °F (37.3 °C)   Temp src 25 1032 Temporal   SpO2 25 1032 97 %   O2 Device 25 1100 None (Room air)       Current Vitals:   Vital Signs  BP:  159/83  Pulse: 102  Resp: 22  Temp: 99 °F (37.2 °C)  Temp src: Temporal    Oxygen Therapy  SpO2: 98 %  O2 Device: None (Room air)        Physical Exam  Vitals and nursing note reviewed.   Constitutional:       General: She is not in acute distress.     Appearance: She is well-developed. She is not ill-appearing.   HENT:      Head: Normocephalic and atraumatic.   Eyes:      Extraocular Movements: Extraocular movements intact.      Pupils: Pupils are equal, round, and reactive to light.   Cardiovascular:      Rate and Rhythm: Normal rate and regular rhythm.      Pulses: Normal pulses.      Heart sounds: Normal heart sounds.   Pulmonary:      Effort: Pulmonary effort is normal. No respiratory distress.      Breath sounds: Normal breath sounds.   Abdominal:      General: Abdomen is flat. There is no distension.      Palpations: Abdomen is soft.      Tenderness: There is no abdominal tenderness.   Musculoskeletal:      Cervical back: Neck supple.   Skin:     General: Skin is dry.   Neurological:      Mental Status: She is alert and oriented to person, place, and time.      Comments: Mild flattening of the left nasolabial fold, symmetric smile   Psychiatric:         Mood and Affect: Mood normal.         Behavior: Behavior normal.             ED Course     Labs Reviewed   COMP METABOLIC PANEL (14) - Abnormal; Notable for the following components:       Result Value    Glucose 143 (*)     Sodium 133 (*)     Potassium 3.2 (*)     Total Protein 8.3 (*)     Albumin 4.9 (*)     All other components within normal limits   POCT GLUCOSE - Abnormal; Notable for the following components:    POC Glucose 135 (*)     All other components within normal limits   POCT ISTAT G4 CARTRIDGE - Abnormal; Notable for the following components:    ISTAT Blood Gas TCO2 21 (*)     All other components within normal limits   PROTHROMBIN TIME (PT) - Normal   PTT, ACTIVATED - Normal   TROPONIN I HIGH SENSITIVITY - Normal   CBC WITH DIFFERENTIAL WITH  PLATELET   VENOUS BLOOD GAS   TYPE AND SCREEN     EKG    Rate, intervals and axes as noted on EKG Report.  Rate: 106  Rhythm: Sinus Rhythm  Reading: Sinus tachycardia, LVH, left axis deviation, Q waves in III and aVF           ED Course as of 04/08/25 1200  ------------------------------------------------------------  Time: 04/08 1056  Comment: Discussed case with neurology, Dr. Art, plan to offer/consent TNK for patient.  Recommends transfer to main  for CT angio.  ------------------------------------------------------------  Time: 04/08 1118  Comment: Discussed CT findings with neurology, Dr. Dubois, we confirm patient's symptom onset at 8:00 AM, recommends TNK and transferred to [facility] for CTA and potential endovascular treatment.  ------------------------------------------------------------  Time: 04/08 1121  Comment: Notified neuro IR regarding case, plan to give TN K and plan to transfer for CTA imaging  ------------------------------------------------------------  Time: 04/08 1131  Comment: I independently interpreted labs, mild hypokalemia, mild hyponatremia noted.  Troponin negative.       XR CHEST AP PORTABLE  (CPT=71045)    Result Date: 4/8/2025  CONCLUSION:  There is no evidence of active cardiopulmonary disease on this single portable chest radiograph.   LOCATION:  [facility]      Dictated by (CST): Win Hardy MD on 4/08/2025 at 11:22 AM     Finalized by (CST): Win Hardy MD on 4/08/2025 at 11:23 AM       CT BRAIN OR HEAD (CPT=70450)    Result Date: 4/8/2025  CONCLUSION:  1. There are findings suspicious for an acute right MCA territory stroke with hyperdense branch of right MCA in the sylvian fissure and decreased attenuation in the right insular cortex.  Additional evaluation with MRI is recommended. 2. There is no evidence of hemorrhage.  Above findings were discussed with Dr. Campa on April 8, 2025 at 11:10 a.m..    LOCATION:  [facility]   Dictated by (CST): Win Hardy MD on 4/08/2025 at  11:05 AM     Finalized by (CST): Win Hardy MD on 4/08/2025 at 11:10 AM             [facility]      Differential diagnosis includes TIA, CVA, complex migraine, Bell's palsy, central retinal artery occlusion, mocks fugax    Admission disposition: 4/8/2025 11:26 AM           Medical Decision Making  Problems Addressed:  Acute CVA (cerebrovascular accident) (HCC): complicated acute illness or injury     Details: Acute onset dysarthria, left lower facial droop, NIH stroke scale was 2, last known normal 8:00 AM  CT head suggest MCA stroke with hyperdense lesion on the right side, CTA is unavailable at this time so we will transfer to main ER for CTA  Patient does qualify for TNK, we discussed risks, benefits, and alternatives to treatment with TNK. She was consented and agreeable, TNK administered prior to transport    Amount and/or Complexity of Data Reviewed  Labs: ordered. Decision-making details documented in ED Course.  Radiology: ordered.     Details: Reviewed radiology report, discussed with neurology radiologist, right MCA stroke with hyperdense lesion suggesting LVO  Discussion of management or test interpretation with external provider(s): Discussed case with neurology, Dr. Art, discussed case with neurointerventional Dr. Saldivar.    Risk  Prescription drug management.  Decision regarding hospitalization.  Diagnosis or treatment significantly limited by social determinants of health.  Minor surgery with no identified risk factors.    Critical Care  Total time providing critical care: 50 minutes (Upon my evaluation, this patient had a high probability of imminent or life-threatening deterioration due to central nervous system failure and acute CVA/stroke , which required my direct attention, intervention, and personal management.    I have personally provided 50 minutes of critical care time, exclusive of time spent on separately billable procedures.  Time includes review of all pertinent laboratory/radiology  results, discussion with consultants, and monitoring for potential decompensation.  Performed interventions included initiation of Thrombolytics  )        Disposition and Plan     Clinical Impression:  1. Acute CVA (cerebrovascular accident) (HCC)         Disposition:  Admit  4/8/2025 11:26 am

## 2025-04-08 NOTE — H&P
[facility]     Breana Banks Patient Status:  Inpatient    1960 MRN [id number]   Location [facility] 6NE-A Attending Pk Saldivar MD   Hosp Day # 0 PCP Jeana Bo DO     Chief Complaint: R eye vision floaters and lightheaded    Subjective:    History of Present Illness:     Breana Banks is a 64 year old female with history of hypertension, obesity presented with lightheadedness, floaters in right peripheral vision, left facial droop, dysarthria.     LKN 6 AM on day of admission. NIHSS 3 on arrival. TNK given at 1120. CT/CTA with findings suspicious for R MCA acute ischemic stroke and hyperdense R MCA. CTA H&N with R MCA occlusion and high grade R ICA stenosis with significant perfusion defect in R MCA territory. Due to low NIH, patient was not a candidate for endovascular mechanical thrombectomy. Patient was admitted to [facility]. She was tachycardic to 130s in the ED. EKG with sinus tachycardia.     History/Other:    Past Medical History:  Past Medical History:    Acute upper respiratory infections of unspecified site    Asymptomatic postmenopausal status (age-related) (natural)    Body piercing    Essential hypertension, benign    Localized superficial swelling, mass, or lump    Normal delivery (HCC)    Streptococcal sore throat    Undiagnosed cardiac murmurs     Past Surgical History:   Past Surgical History:   Procedure Laterality Date          Tonsillectomy      Tubal ligation  02      Family History:   Family History   Problem Relation Age of Onset    Cancer Father     Hypertension Paternal Grandmother     Stroke Maternal Grandmother      Social History:    reports that she has never smoked. She has never been exposed to tobacco smoke. She has never used smokeless tobacco. She reports current alcohol use of about 2.5 standard drinks of alcohol per week. She reports that she does not use drugs.     Allergies: Allergies[1]    Medications:   Medications Ordered Prior to Encounter[2]    Review of Systems:   A comprehensive review of systems was completed.    Pertinent positives and negatives noted in the HPI.    Objective:   Physical Exam:    BP (!) 169/96   Pulse 97   Temp 98.2 °F (36.8 °C)   Resp 19   Ht 5' 8\" (1.727 m)   Wt 262 lb 2 oz (118.9 kg)   SpO2 94%   BMI 39.86 kg/m²   General: No acute distress, Alert  Respiratory: No rhonchi, no wheezes  Cardiovascular: S1, S2. Regular rate and rhythm  Abdomen: Soft, Non-tender, non-distended, positive bowel sounds  Neuro: No new focal deficits. Mild intermittent dysarthria   Extremities: No edema    Results:    Labs:      Labs Last 24 Hours:    Recent Labs   Lab 04/08/25  1049   RBC 4.56   HGB 14.8   HCT 41.7   MCV 91.4   MCH 32.5   MCHC 35.5   RDW 12.5   NEPRELIM 6.32   WBC 9.2   .0       Recent Labs   Lab 04/08/25  1049   *   BUN 11   CREATSERUM 0.83   EGFRCR 78   CA 9.9   ALB 4.9*   *   K 3.2*   CL 98   CO2 23.0   ALKPHO 80   AST 21   ALT 24   BILT 0.5   TP 8.3*       Estimated Glomerular Filtration Rate: 78 mL/min/1.73m2 (result from lab).    Lab Results   Component Value Date    INR 0.92 04/08/2025       Recent Labs   Lab 04/08/25  1049   TROPHS 4       No results for input(s): \"TROP\", \"PBNP\" in the last 168 hours.    No results for input(s): \"PCT\" in the last 168 hours.    Imaging: Imaging data reviewed in Epic.    Assessment & Plan:      #Acute R MCA CVA s/p TNK 4/8   #High grade R ICA stenosis, R MCA occlusion, not a candidate for thrombectomy due to low NIH   -Neuro critical care following  -TNK protocol  -Stroke protocol   -Stroke labs, TTE pending   -Repeat CT at 24 hours  -Statin    #Hypokalemia - supplement per procol     #Mild hyponatremia - trend     #Sinus tachycardia - improved     #Hypertension-   #obesity, BMI 39.86    Upon my evaluation, this patient had a high probability of imminent or life-threatening deterioration due to central nervous system failure,  which required my direct attention, intervention, and personal management.    I have personally provided 35 minutes of critical care time, exclusive of time spent on separately billable procedures.  Time includes review of all pertinent laboratory/radiology results, discussion with consultants, and monitoring for potential decompensation.  Performed interventions included fluids.      Plan of care discussed with patient    Daniela Valenzuela MD    Supplementary Documentation:     The 21st Century Cures Act makes medical notes like these available to patients in the interest of transparency. Please be advised this is a medical document. Medical documents are intended to carry relevant information, facts as evident, and the clinical opinion of the practitioner. The medical note is intended as peer to peer communication and may appear blunt or direct. It is written in medical language and may contain abbreviations or verbiage that are unfamiliar.                                       [1] No Known Allergies  [2]   No current facility-administered medications on file prior to encounter.     Current Outpatient Medications on File Prior to Encounter   Medication Sig Dispense Refill    benzonatate 200 MG Oral Cap Take 1 capsule (200 mg total) by mouth 3 (three) times daily as needed for cough. 20 capsule 0    lisinopril-hydroCHLOROthiazide 10-12.5 MG Oral Tab Take 1 tablet by mouth daily. 90 tablet 3    Omega-3 1000 MG Oral Cap Take 1,000 mg by mouth every morning.      Cholecalciferol (VITAMIN D) 1000 units Oral Tab Take 1,000 Units by mouth every morning.

## 2025-04-08 NOTE — ED PROVIDER NOTES
Patient was sent from [place] emergency room..  The patient was brought in from [place] emergency room patient had slurred speech starting tomorrow this this morning.  The patient became had slurred speech and because of the slurred speech was sent to the emergency room patient arrived there had slurred speech left facial droop.  Discussed the risk and benefits of doing TNK.  TNK was given.  Was sent here for further evaluation.  As it did not have a CTA machine there.  Patient had already got TNK by time I saw her the patient had some slight slurred speech.  She does have a little place left facial droop.  No focal weakness that I can see at this present time.  NIH score of 2.  The family states that speech is not any better than when she was at home.  The patient's case was discussed with Michelle for neuro.  The patient case will be discussed with the neuro interventionalists.  Who will actually see the patient in consultation.  CTA was done which showed does show a large vessel occlusion of the right MCA.    There was also some other abnormality seen on the CTA.      XR CHEST AP PORTABLE  (CPT=71045)    Result Date: 4/8/2025  PROCEDURE:  XR CHEST AP PORTABLE  (CPT=71045)  TECHNIQUE:  AP chest radiograph was obtained.  COMPARISON:  None.  INDICATIONS:  lightheaded  PATIENT STATED HISTORY: (As transcribed by Technologist)  This morning patient had onset of slurred speech and states her \"head didn't feel right\".  She denies any chest pain or specific dizziness.  She does complain of a floater in her right eye.    FINDINGS:  Lungs and pleural spaces are clear.  Cardiac size is within normal limits.  Mediastinum and sergo are unremarkable.  Chest wall structures are unremarkable.            CONCLUSION:  There is no evidence of active cardiopulmonary disease on this single portable chest radiograph.   LOCATION:  [place]      Dictated by (CST): Win Hardy MD on 4/08/2025 at 11:22 AM     Finalized by  (CST): Win Hardy MD on 4/08/2025 at 11:23 AM       CT BRAIN OR HEAD (CPT=70450)    Result Date: 4/8/2025  PROCEDURE:  CT BRAIN OR HEAD (59393)  COMPARISON:  None.  INDICATIONS:  lightheaded  TECHNIQUE:  Noncontrast CT scanning is performed through the brain. Dose reduction techniques were used. Dose information is transmitted to the ACR (American College of Radiology) NRDR (National Radiology Data Registry) which includes the Dose Index Registry.  PATIENT STATED HISTORY: (As transcribed by Technologist)  Patient complains of dizziness.    FINDINGS:  VENTRICLES/SULCI:  Ventricles and sulci are normal in size.  INTRACRANIAL:  Focal hyperdensity in the right sylvian fissure series 3, image 17 could represent a hyperdense M2 or M3 branch.  There is associated decreased attenuation in the right insular cortex (series 3, image 18).  The findings are suspicious for an acute right MCA stroke.  Correlation with clinical findings is necessary.  There is no evidence of hemorrhage or mass effect related to this finding. SINUSES:           There is mucosal thickening in the maxillary sinuses bilaterally. MASTOIDS:          No sign of acute inflammation. SKULL:             No evidence for fracture or osseous abnormality. OTHER:             None.            CONCLUSION:  1. There are findings suspicious for an acute right MCA territory stroke with hyperdense branch of right MCA in the sylvian fissure and decreased attenuation in the right insular cortex.  Additional evaluation with MRI is recommended. 2. There is no evidence of hemorrhage.  Above findings were discussed with Dr. Campa on April 8, 2025 at 11:10 a.m..    LOCATION:  [facility]   Dictated by (CST): Win Hardy MD on 4/08/2025 at 11:05 AM     Finalized by (CST): Win Hardy MD on 4/08/2025 at 11:10 AM          Talk to the radiologist.  He did official reading is pending but did show what seems to be is a right MCA large vessel occlusion and possibly left-sided  distal loop occlusions in the right c carotid occlusion.  Dr. Wilmar clemens see the patient consultation he stated the patient does not meet criteria to do interventions and a score is 2.  But will need to go to the neuro ICU.  Patient was admitted directly to the neuro ICU.    A total of 35 minutes of critical care time (exclusive of billable procedures) was administered to manage the patient's critical imaging findings due to hercva  This involved direct patient intervention, complex decision making, and/or extensive discussions with the patient, family, and clinical staff.

## 2025-04-08 NOTE — ED QUICK NOTES
Received bedside lunch report for MABEL Choi. Assumed care at this time. Dr. Saldivar and MARIA TERESA at bedside with patient currently discussing plan of care.

## 2025-04-08 NOTE — SLP NOTE
ADULT SWALLOWING EVALUATION    ASSESSMENT    ASSESSMENT/OVERALL IMPRESSION:  Patient is a 63 y/o female admitted with stroke-like symptoms and PMHx significant for HTN. SLP order received to evaluate and treat per stroke protocol. Patient received alert and oriented in bed. Patient denied history of dysphagia symptoms and reported consuming a regular diet and thin liquids at baseline. Patient mildly dysarthric with minimal left facial droop, but strength and ROM appeared WFL.    Patient presented with a largely intact oropharyngeal swallow. Bolus acceptance was adequate without evidence of anterior bolus loss. Mastication and AP bolus transit were thorough and efficient without evidence of oral residue. Pharyngeal swallow initiation appeared timely and hyolaryngeal excursion was adequate per palpation.  Patient with delayed cough x1 following thin liquid trial. Verbal cues provided to limit bolus size and no further s/s of aspiration observed.    Recommend patient initiate a regular diet and thin liquids. Bolus size and rate of intake should be limited. SLP will continue to follow to monitor diet tolerance and complete communication evaluation when appropriate. Education provided re: results and recommendations.         RECOMMENDATIONS   Diet Recommendations - Solids: Regular  Diet Recommendations - Liquids: Thin Liquids                           Aspiration Precautions: Upright position, Small bites, Small sips  Medication Administration Recommendations: One pill at a time  Treatment Plan/Recommendations: Aspiration precautions, Communication evaluation    HISTORY   MEDICAL HISTORY  Reason for Referral: Stroke protocol    Problem List  Principal Problem:    Acute CVA (cerebrovascular accident) (HCC)  Active Problems:    Hypokalemia    Hyponatremia      Past Medical History  Past Medical History:    Acute upper respiratory infections of unspecified site    Asymptomatic postmenopausal status (age-related) (natural)     Body piercing    Essential hypertension, benign    Localized superficial swelling, mass, or lump    Normal delivery (HCC)    Streptococcal sore throat    Undiagnosed cardiac murmurs          Diet Prior to Admission: Regular, Thin liquids       Patient/Family Goals: none stated    SWALLOWING HISTORY  Current Diet Consistency: NPO  Dysphagia History: as above  Imaging Results:   CTA Brain 4/8/25  CONCLUSION:    1. Large vessel occlusion involving 2 of 3 branches at the right MCA trifurcation.  There is associated severe perfusion defect which involves the majority of the right MCA territory.  There is no corresponding cerebral blood flow defect which I suspect   is artifact based on appearance on the noncontrast CT although there is certainly a large area of ischemic penumbra.   2. Severe stenosis or near occlusion cervical right internal carotid artery at the origin of the vessel.   3. There is also a perfusion defect in the left frontal lobe which would be peripheral anterior MCA territory.  There may be occlusion of smaller vessel which would be a 4th order branch of left MCA anteriorly as there is hypoperfusion of this segment   noted with absence of small vessels.  The exact occluded branch cannot be identified on this study.   4. There is hypodensity again noted right insular cortex and around the right sylvian fissure which is probably infarct related to the MCA occlusion on the right.      Above findings were discussed with Dr. Olvera on April 8, 2025 at 12:45 p.m..          LOCATION:  [facility]         Dictated by (CST): Win Hardy MD on 4/08/2025 at 12:26 PM       Finalized by (CST): Win Hardy MD on 4/08/2025 at 12:45 PM     SUBJECTIVE       OBJECTIVE   ORAL MOTOR EXAMINATION  Dentition: Functional  Symmetry: Reduced left facial (minimal)  Strength: Within Functional Limits     Range of Motion: Within Functional Limits       Voice Quality: Clear  Respiratory Status: Unlabored  Consistencies Trialed:  Thin liquids, Puree, Hard solid  Method of Presentation: Staff/Clinician assistance  Patient Positioned: Upright, Midline    Oral Phase of Swallow: Within Functional Limits                      Pharyngeal Phase of Swallow: Within Functional Limits           (Please note: Silent aspiration cannot be evaluated clinically. Videofluoroscopic Swallow Study is required to rule-out silent aspiration.)    Esophageal Phase of Swallow: No complaints consistent with possible esophageal involvement  Comments: d/w RN              GOALS  Goal #1 The patient will tolerate regular consistency and thin liquids without overt signs or symptoms of aspiration with 90 % accuracy over 1-2 session(s).  In Progress   Goal #2 The patient/family/caregiver will demonstrate understanding and implementation of aspiration precautions and swallow strategies independently over 1-2 session(s).    In Progress   Goal #3 Communication evaluation.  In Progress   Goal #4     Goal #5     Goal #6     Goal #7     Goal #8       FOLLOW UP  Treatment Plan/Recommendations: Aspiration precautions, Communication evaluation     Follow Up Needed (Documentation Required): Yes  SLP Follow-up Date: 04/09/25    Thank you for your referral.   If you have any questions, please contact ALY Maurer

## 2025-04-08 NOTE — CONSULTS
[facility]  Neurocritical Care Consult Note    Breana Banks Patient Status:  Emergency    1960 MRN [id number]   [facility] EMERGENCY DEPARTMENT Attending Carl Donohue MD   Hosp Day # 0 PCP Jeana Bo DO       Reason for Consultation:   AIS    HPI:   Patient is a 64 year old female with a h/o htn p/w R mca acute ischemic stroke . Pt had onset of vision changes, facial droop and slurred speech thus came to  ED where w/u revealed NIHSS 2, ct/cta with findings suspicious for R mca AIS and hyperdense R mca thus given iv tnk and transferred to  where CTA/P revealed R mca occlusion and high grade R ica stenosis with significant perfusion defect in R mca territory, but due to low NIH pt was not a candidate for endovascular mechanical thrombectomy per MAXIME, and pt was transferred to [facility] for further monitoring.     Past Medical History:    Acute upper respiratory infections of unspecified site    Asymptomatic postmenopausal status (age-related) (natural)    Body piercing    Essential hypertension, benign    Localized superficial swelling, mass, or lump    Normal delivery (HCC)    Streptococcal sore throat    Undiagnosed cardiac murmurs       Past Surgical History:   Procedure Laterality Date          Tonsillectomy      Tubal ligation  02       Prescriptions Prior to Admission[1]  Allergies[2]  Social History     Socioeconomic History    Marital status:    Tobacco Use    Smoking status: Never     Passive exposure: Never    Smokeless tobacco: Never   Vaping Use    Vaping status: Never Used   Substance and Sexual Activity    Alcohol use: Yes     Alcohol/week: 2.5 standard drinks of alcohol     Types: 3 Standard drinks or equivalent per week     Comment: social    Drug use: No   Other Topics Concern    Caffeine Concern Yes    Exercise Yes     Family History   Problem Relation Age of Onset    Cancer Father     Hypertension Paternal Grandmother      Stroke Maternal Grandmother        Current Meds:  Current Facility-Administered Medications   Medication Dose Route Frequency    sodium chloride 0.9% infusion   Intravenous Continuous       Review of Systems:     Constitutional:    Denies unusual weight loss or weight gain, fever/chills or night sweats.  HEENT:                Denies changes in vision or difficulty swallowing.  Pulm:                    Denies dyspnea, cough, or sputum production  Cardiac:               Denies chest pain, palpitations or lower extremity edema.  GI:                         Denies constipation, heartburn or melena.  :                       Denies dysuria or hematuria.  Skin:                     Denies rashes or open areas.  Neuro:                  As per HPI    All other systems were reviewed and are negative.    Vitals:   Temp:  [99 °F (37.2 °C)-99.2 °F (37.3 °C)] 99 °F (37.2 °C)  Pulse:  [] 102  Resp:  [16-22] 22  BP: (159-177)/(83-89) 159/83  SpO2:  [97 %-100 %] 98 %  Body mass index is 39.49 kg/m².    Intake/Output:  No intake or output data in the 24 hours ending 04/08/25 1209    Physical Examination:   General- No acute distress  CV- RRR  Resp- CTA Bilat  Neuro-  Mental status- awake and alert, regards and follows commands, oriented x3, speech fluent with very mild dysarthria  Cranial nerves- pupils equally round and reactive to light, extraocular muscles intact, face symmetric, visual fields full  Motor- 5/5 throughout  Sens-  Intact to light touch    Diagnostics:   XR CHEST AP PORTABLE  (CPT=71045)    Result Date: 4/8/2025  CONCLUSION:  There is no evidence of active cardiopulmonary disease on this single portable chest radiograph.   LOCATION:  [facility]      Dictated by (CST): Win Hardy MD on 4/08/2025 at 11:22 AM     Finalized by (CST): Win Hardy MD on 4/08/2025 at 11:23 AM       CT BRAIN OR HEAD (CPT=70450)    Result Date: 4/8/2025  CONCLUSION:  1. There are findings suspicious for an acute right MCA territory  stroke with hyperdense branch of right MCA in the sylvian fissure and decreased attenuation in the right insular cortex.  Additional evaluation with MRI is recommended. 2. There is no evidence of hemorrhage.  Above findings were discussed with Dr. Campa on April 8, 2025 at 11:10 a.m..    LOCATION:  [facility]   Dictated by (CST): Win Hardy MD on 4/08/2025 at 11:05 AM     Finalized by (CST): Win Hardy MD on 4/08/2025 at 11:10 AM        Lab Review     Recent Labs   Lab 04/08/25  1049   *   K 3.2*   CL 98   CO2 23.0   *   BUN 11   CREATSERUM 0.83     Recent Labs   Lab 04/08/25  1049   WBC 9.2   HGB 14.8   .0       Assesment/Plan:     Neuro:  R mca AIS- 2/2 high grade R ica stenosis and R mca occlusion.  Pt with risk factors of htn and newly diagnosed carotid stenosis.  Now s/p iv tnk, cont post tnk protocol with neurochecks/pt/ot/st.   Pt not a candidate for mechanical thrombectomy per MAXIME due to low NIH, will cont to monitor exam and if worsens may re-consider at that time.  Rpt ct 24hrs post tnk (or sooner if any clinical decompensation) and complete stroke w/u (labs, tte etc).   Cardiac:  Htn- sbp goal 100-180 post TNK, check tte with bubble to eval for source of infarct, cont statin  Pulmonary:  Stable on RA  Renal:  IVFs, monitor BUN/Cr  GI:  PO as tolerated  Heme/ID:  Afebrile, no leukocytosis  Endocrine/Rheum:  Monitor glucose, sliding scale insulin prn  DVT Prophylaxis:  SCD’s    Goals of the Day: neurochecks, post tnk protocol   Upon my evaluation, this patient had a high probability of imminent or life-threatening deterioration due to central nervous system failure and acute CVA/stroke , which required my direct attention, intervention, and personal management.    I have personally provided 80 minutes of critical care time, exclusive of time spent on separately billable procedures.  Time includes review of all pertinent laboratory/radiology results, discussion with consultants, and  monitoring for potential decompensation.  Performed interventions included initiation of Thrombolytics.     Thank you for allowing me to participate in the care of this patient.     Puneet Art MD, [facility]  Medical Director of System Neurosciences  Chief, Section of Neurocritical Care  [facility] [place]           [1] (Not in a hospital admission)  [2] No Known Allergies

## 2025-04-08 NOTE — ED INITIAL ASSESSMENT (HPI)
Pt states she had windows installed yesterday, CO monitor went off opened windows woke up this morning and felt lightheaded and r eye vision floaters.

## 2025-04-08 NOTE — CONSULTS
[facility]  Neurosurgery Consult    Breana Banks Patient Status:  Emergency    1960 MRN [id number]   Location [facility] EMERGENCY DEPARTMENT Attending Carl Donohue MD   Hosp Day # 0 PCP Jeana Bo DO     REASON FOR CONSULTATION:  Stroke alert    BRENDAN stenosis  Right MCA occlusion/stenosis    HISTORY OF PRESENT ILLNESS:  Breana Banks is a(n) 64 year old female with a PMH of HTN who presented with right eye visual changes which subsided. She then developed left droop and left leg weakness. TNK was administered. CT negative for hemorrhage. CTA H/N shows BRENDAN stenosis and RMCA occlusion. CTP shows ischemia in the RMCA territory.     Currently patient has slurred speech and left droop.  is at bedside. Dr Saldivar at bedside to discuss recommendations.     PAST MEDICAL HISTORY:  Past Medical History:    Acute upper respiratory infections of unspecified site    Asymptomatic postmenopausal status (age-related) (natural)    Body piercing    Essential hypertension, benign    Localized superficial swelling, mass, or lump    Normal delivery (HCC)    Streptococcal sore throat    Undiagnosed cardiac murmurs       PAST SURGICAL HISTORY:  Past Surgical History:   Procedure Laterality Date          Tonsillectomy      Tubal ligation  02       FAMILY HISTORY:  family history includes Cancer in her father; Hypertension in her paternal grandmother; Stroke in her maternal grandmother.    SOCIAL HISTORY:   reports that she has never smoked. She has never been exposed to tobacco smoke. She has never used smokeless tobacco. She reports current alcohol use of about 2.5 standard drinks of alcohol per week. She reports that she does not use drugs.    ALLERGIES:  Allergies[1]    MEDICATIONS:  Prescriptions Prior to Admission[2]  Current Facility-Administered Medications   Medication Dose Route Frequency    sodium chloride 0.9% infusion   Intravenous Continuous       REVIEW OF SYSTEMS:  A  10-point system was reviewed.  Pertinent positives and negatives are noted in HPI.      PHYSICAL EXAMINATION:  VITAL SIGNS: /80   Pulse (!) 133   Temp 98.4 °F (36.9 °C) (Temporal)   Resp 20   Ht 68\"   Wt 259 lb 11.2 oz (117.8 kg)   SpO2 98%   BMI 39.49 kg/m²   GENERAL:  Patient is a 64 year old female in no acute distress.  HEENT:  Normocephalic, atraumatic.  NEUROLOGICAL:  This patient is alert and orientated x 3.  Slurred speech and left droop. Comprehension intact.   PERRLA +3 brisk,  EOMI.  Face is symmetrical. CN's GI.  Sensation to light touch is intact bilaterally.  No Pronator Drift.  Strengths 5/5 bilaterally. Finger-to-nose coordination is intact.  Gait deferred.       NIHSS 2    DIAGNOSTIC DATA:   Lab Results   Component Value Date    WBC 9.2 04/08/2025    HGB 14.8 04/08/2025    HCT 41.7 04/08/2025    .0 04/08/2025    CREATSERUM 0.83 04/08/2025    BUN 11 04/08/2025     04/08/2025    K 3.2 04/08/2025    CL 98 04/08/2025    CO2 23.0 04/08/2025     04/08/2025    CA 9.9 04/08/2025    ALB 4.9 04/08/2025    ALKPHO 80 04/08/2025    BILT 0.5 04/08/2025    TP 8.3 04/08/2025    AST 21 04/08/2025    ALT 24 04/08/2025    PTT 28.0 04/08/2025    INR 0.92 04/08/2025    PTP 12.2 04/08/2025    PGLU 135 04/08/2025       IMAGING:  XR CHEST AP PORTABLE  (CPT=71045)    Result Date: 4/8/2025  CONCLUSION:  There is no evidence of active cardiopulmonary disease on this single portable chest radiograph.   LOCATION:  [facility]      Dictated by (CST): Win Hardy MD on 4/08/2025 at 11:22 AM     Finalized by (CST): Win Hardy MD on 4/08/2025 at 11:23 AM       CT BRAIN OR HEAD (CPT=70450)    Result Date: 4/8/2025  CONCLUSION:  1. There are findings suspicious for an acute right MCA territory stroke with hyperdense branch of right MCA in the sylvian fissure and decreased attenuation in the right insular cortex.  Additional evaluation with MRI is recommended. 2. There is no evidence of hemorrhage.   Above findings were discussed with Dr. Campa on April 8, 2025 at 11:10 a.m..    LOCATION:  [place]   Dictated by (CST): Win Hardy MD on 4/08/2025 at 11:05 AM     Finalized by (CST): Win Hardy MD on 4/08/2025 at 11:10 AM         ASSESSMENT:  65 yo female with BRENDAN stenosis and RMCA occlusion    Dr Saldivar at bedside to discuss the risks, benefits and alternatives to doing a mechanical thrombectomy procedure vs watching symptoms with delayed carotid revascularization vs no treatment. Due to the low NIHSS, the risk doing a mechanical thrombectomy could possibly increase risk of worsening stroke symptoms as opposed to monitoring with medical management.  We discussed the possibility of performing carotid revascularization at a later time.    Plan:  Admit to neuro ICU  Medical management per neurocritical care/hospitalist  Maintain systolic blood pressure between 140 and 200  Monitor blood pressure with arterial line  Maintain flat bedrest unless there is any respiratory compromise, until stabilized  Stroke up workup per neurocritical care    The plan of care was discussed with Dr. Olvera.    MARIA TERESA Butterfield  [facility]  4/8/2025, 12:55 PM   Spectre [id number]      A total of 30 minutes of visit time (exclusible of billable procedures) was administered.  > 50 % of time spent counseling/coordinating care     Is this a shared or split note between Advanced Practice Provider and Physician? Yes          [1] No Known Allergies  [2] (Not in a hospital admission)

## 2025-04-08 NOTE — ED QUICK NOTES
Arrives via EMS from the PED due to acute CVA. Given TNK at 1120. Symptoms initially were facial palsy and slurred speech.     Stroke Alert called 1148 by PCT

## 2025-04-09 ENCOUNTER — APPOINTMENT (OUTPATIENT)
Dept: CV DIAGNOSTICS | Facility: HOSPITAL | Age: 65
End: 2025-04-09
Attending: INTERNAL MEDICINE
Payer: COMMERCIAL

## 2025-04-09 ENCOUNTER — APPOINTMENT (OUTPATIENT)
Dept: CT IMAGING | Facility: HOSPITAL | Age: 65
End: 2025-04-09
Attending: EMERGENCY MEDICINE
Payer: COMMERCIAL

## 2025-04-09 VITALS
RESPIRATION RATE: 23 BRPM | WEIGHT: 261 LBS | SYSTOLIC BLOOD PRESSURE: 150 MMHG | HEIGHT: 68 IN | BODY MASS INDEX: 39.56 KG/M2 | TEMPERATURE: 98 F | OXYGEN SATURATION: 92 % | HEART RATE: 82 BPM | DIASTOLIC BLOOD PRESSURE: 85 MMHG

## 2025-04-09 LAB
ANION GAP SERPL CALC-SCNC: 9 MMOL/L (ref 0–18)
BUN BLD-MCNC: 8 MG/DL (ref 9–23)
CALCIUM BLD-MCNC: 9.1 MG/DL (ref 8.7–10.6)
CHLORIDE SERPL-SCNC: 101 MMOL/L (ref 98–112)
CO2 SERPL-SCNC: 25 MMOL/L (ref 21–32)
CREAT BLD-MCNC: 0.78 MG/DL (ref 0.55–1.02)
EGFRCR SERPLBLD CKD-EPI 2021: 84 ML/MIN/1.73M2 (ref 60–?)
ERYTHROCYTE [DISTWIDTH] IN BLOOD BY AUTOMATED COUNT: 12.6 %
GLUCOSE BLD-MCNC: 112 MG/DL (ref 70–99)
GLUCOSE BLD-MCNC: 113 MG/DL (ref 70–99)
GLUCOSE BLD-MCNC: 132 MG/DL (ref 70–99)
HCT VFR BLD AUTO: 35 % (ref 35–48)
HGB BLD-MCNC: 12.6 G/DL (ref 12–16)
MCH RBC QN AUTO: 32.1 PG (ref 26–34)
MCHC RBC AUTO-ENTMCNC: 36 G/DL (ref 31–37)
MCV RBC AUTO: 89.3 FL (ref 80–100)
OSMOLALITY SERPL CALC.SUM OF ELEC: 279 MOSM/KG (ref 275–295)
PLATELET # BLD AUTO: 316 10(3)UL (ref 150–450)
POTASSIUM SERPL-SCNC: 3.6 MMOL/L (ref 3.5–5.1)
POTASSIUM SERPL-SCNC: 3.6 MMOL/L (ref 3.5–5.1)
POTASSIUM SERPL-SCNC: 4.2 MMOL/L (ref 3.5–5.1)
RBC # BLD AUTO: 3.92 X10(6)UL (ref 3.8–5.3)
SODIUM SERPL-SCNC: 135 MMOL/L (ref 136–145)
WBC # BLD AUTO: 11.4 X10(3) UL (ref 4–11)

## 2025-04-09 PROCEDURE — 99233 SBSQ HOSP IP/OBS HIGH 50: CPT | Performed by: NEUROLOGICAL SURGERY

## 2025-04-09 PROCEDURE — 99239 HOSP IP/OBS DSCHRG MGMT >30: CPT | Performed by: STUDENT IN AN ORGANIZED HEALTH CARE EDUCATION/TRAINING PROGRAM

## 2025-04-09 PROCEDURE — 93306 TTE W/DOPPLER COMPLETE: CPT | Performed by: INTERNAL MEDICINE

## 2025-04-09 PROCEDURE — 99291 CRITICAL CARE FIRST HOUR: CPT | Performed by: INTERNAL MEDICINE

## 2025-04-09 PROCEDURE — 70450 CT HEAD/BRAIN W/O DYE: CPT | Performed by: EMERGENCY MEDICINE

## 2025-04-09 RX ORDER — HYDRALAZINE HYDROCHLORIDE 20 MG/ML
10 INJECTION INTRAMUSCULAR; INTRAVENOUS EVERY 2 HOUR PRN
Status: DISCONTINUED | OUTPATIENT
Start: 2025-04-09 | End: 2025-04-09

## 2025-04-09 RX ORDER — LORAZEPAM 2 MG/ML
1 INJECTION INTRAMUSCULAR ONCE
Status: DISCONTINUED | OUTPATIENT
Start: 2025-04-09 | End: 2025-04-09

## 2025-04-09 RX ORDER — METOCLOPRAMIDE HYDROCHLORIDE 5 MG/ML
10 INJECTION INTRAMUSCULAR; INTRAVENOUS EVERY 8 HOURS PRN
Status: DISCONTINUED | OUTPATIENT
Start: 2025-04-09 | End: 2025-04-09

## 2025-04-09 RX ORDER — ACETAMINOPHEN 325 MG/1
650 TABLET ORAL EVERY 4 HOURS PRN
Status: DISCONTINUED | OUTPATIENT
Start: 2025-04-09 | End: 2025-04-09

## 2025-04-09 RX ORDER — ONDANSETRON 2 MG/ML
4 INJECTION INTRAMUSCULAR; INTRAVENOUS EVERY 6 HOURS PRN
Status: DISCONTINUED | OUTPATIENT
Start: 2025-04-09 | End: 2025-04-09

## 2025-04-09 RX ORDER — ACETAMINOPHEN 650 MG/1
650 SUPPOSITORY RECTAL EVERY 4 HOURS PRN
Status: DISCONTINUED | OUTPATIENT
Start: 2025-04-09 | End: 2025-04-09

## 2025-04-09 RX ORDER — POTASSIUM CHLORIDE 1500 MG/1
40 TABLET, EXTENDED RELEASE ORAL EVERY 4 HOURS
Status: COMPLETED | OUTPATIENT
Start: 2025-04-09 | End: 2025-04-09

## 2025-04-09 RX ORDER — ASPIRIN 300 MG/1
300 SUPPOSITORY RECTAL DAILY
Status: DISCONTINUED | OUTPATIENT
Start: 2025-04-09 | End: 2025-04-09

## 2025-04-09 RX ORDER — ASPIRIN 325 MG
325 TABLET ORAL DAILY
Status: DISCONTINUED | OUTPATIENT
Start: 2025-04-09 | End: 2025-04-09

## 2025-04-09 RX ORDER — LABETALOL HYDROCHLORIDE 5 MG/ML
10 INJECTION, SOLUTION INTRAVENOUS EVERY 10 MIN PRN
Status: DISCONTINUED | OUTPATIENT
Start: 2025-04-09 | End: 2025-04-09

## 2025-04-09 RX ORDER — ENOXAPARIN SODIUM 100 MG/ML
40 INJECTION SUBCUTANEOUS DAILY
Status: DISCONTINUED | OUTPATIENT
Start: 2025-04-09 | End: 2025-04-09

## 2025-04-09 NOTE — PLAN OF CARE
Assumed care of patient today from the ED.  at bedside. Neuro exam intact with exception of slight dysarthria when speaking that seemed to gradually improve throughout the shift. . Art line placed by anesthesia for close BP monitoring. BP parameters >140 to 180's. Family at bedside for updates on plan of care. Bedside swallow done and diet ordered. Bedside report given at change of shift.     Mary MONROE

## 2025-04-09 NOTE — PROGRESS NOTES
[facility]  Neurosurgery Progress Note    Breana Banks Patient Status:  Inpatient    1960 MRN [id number]   [facility] 6NE-A Attending Pk Saldivar MD   Hosp Day # 1 PCP Jeana Bo DO     Chief Complaint:  Right MCA occlusion  BRENDAN stenosis    Subjective:  Up in chair eating breakfast. Feeling well. Symptoms improved.     Objective/Physical Exam:    Vital Signs:  Blood pressure 150/79, pulse 75, temperature 98.6 °F (37 °C), temperature source Temporal, resp. rate 17, height 68\", weight 261 lb (118.4 kg), SpO2 94%.  Respiratory:  nonlabored  CV:  well perfused  General: NAD, Speech Fluent, Mood Appropriate  Neurologic:   A&Ox3  PERRL, EOMi, slight left NLF  Full strength x 4, no drift    Labs:  Recent Labs   Lab 25  1049 25  0426   RBC 4.56 3.92   HGB 14.8 12.6   HCT 41.7 35.0   MCV 91.4 89.3   MCH 32.5 32.1   MCHC 35.5 36.0   RDW 12.5 12.6   NEPRELIM 6.32  --    WBC 9.2 11.4*   .0 316.0       Recent Labs   Lab 25  1049 25  0426   * 113*   BUN 11 8*   CREATSERUM 0.83 0.78   EGFRCR 78 84   CA 9.9 9.1   * 135*   K 3.2* 3.6  3.6   CL 98 101   CO2 23.0 25.0       Imaging:  CT STROKE (ABI) CTA BRAIN/CTA NECK+PERF(CPT=70496/56296/0042T)  Result Date: 2025  CONCLUSION:  1. Large vessel occlusion involving 2 of 3 branches at the right MCA trifurcation.  There is associated severe perfusion defect which involves the majority of the right MCA territory.  There is no corresponding cerebral blood flow defect which I suspect is artifact based on appearance on the noncontrast CT although there is certainly a large area of ischemic penumbra. 2. Severe stenosis or near occlusion cervical right internal carotid artery at the origin of the vessel. 3. There is also a perfusion defect in the left frontal lobe which would be peripheral anterior MCA territory.  There may be occlusion of smaller vessel which would be a 4th order branch of left MCA  anteriorly as there is hypoperfusion of this segment noted with absence of small vessels.  The exact occluded branch cannot be identified on this study. 4. There is hypodensity again noted right insular cortex and around the right sylvian fissure which is probably infarct related to the MCA occlusion on the right.  Above findings were discussed with Dr. Olvera on April 8, 2025 at 12:45 p.m..    LOCATION:  [facility]   Dictated by (CST): Win Hardy MD on 4/08/2025 at 12:26 PM     Finalized by (CST): Win Hardy MD on 4/08/2025 at 12:45 PM       XR CHEST AP PORTABLE  (CPT=71045)  Result Date: 4/8/2025  CONCLUSION:  There is no evidence of active cardiopulmonary disease on this single portable chest radiograph.   LOCATION:  [facility]      Dictated by (CST): Win Hardy MD on 4/08/2025 at 11:22 AM     Finalized by (CST): Win Hardy MD on 4/08/2025 at 11:23 AM       CT BRAIN OR HEAD (CPT=70450)  Result Date: 4/8/2025  CONCLUSION:  1. There are findings suspicious for an acute right MCA territory stroke with hyperdense branch of right MCA in the sylvian fissure and decreased attenuation in the right insular cortex.  Additional evaluation with MRI is recommended. 2. There is no evidence of hemorrhage.  Above findings were discussed with Dr. Campa on April 8, 2025 at 11:10 a.m..    LOCATION:  [facility]   Dictated by (CST): Win Hardy MD on 4/08/2025 at 11:05 AM     Finalized by (CST): Win Hardy MD on 4/08/2025 at 11:10 AM            Assessment:  63 yo acute stroke with BRENDAN stenosis and RMCA occlusion s/p TNK  No thrombectomy due to low NIHSS    Plan:  Stroke work up per [facility]  MRI pending  -180  DVT prophylaxis SCD  Will plan for carotid revascularization based on MRI imaging and on going stroke work up.     Is this a shared or split note between Advanced Practice Provider and Physician? Yes       MARIA TERESA Butterfield  [facility]  4/9/2025, 8:54 AM  Spectre [id number]

## 2025-04-09 NOTE — PROGRESS NOTES
Pt stable with neuro exam intact.  No longer with facial droop or dysarthria.  SBP within parameters.      Discussed SBP and bedrest orders with Dr. Art and Dr. Saldivar.    -Maintain SBP less than 180 and DBP less than 105  -Ok to have HOB raised  -Neuro checks Q1h  -Notify provider with any change    Margarette MOREL  Critical Care Nurse Practitioner  Spec [phone]

## 2025-04-09 NOTE — PLAN OF CARE
Assumed care of patient this morning.  at bedside for plan of care updates. Plan to transfer to [facility] today. Blood pressure stable within BP parameters ordered. Neuro exam unchanged. Subtle left facial droop with slight dysarthria. Repeat head CT done. Speech and language clear. is clear Neuro assessments changed to Q2, Bed available at [place]. Nurse to nurse phone report done. All questions answered. DC orders placed. Transfer made by ambulance.  following.     Mary MONROE

## 2025-04-09 NOTE — OCCUPATIONAL THERAPY NOTE
OT order received, chart reviewed, spoke to RN, pt to t/f to [facility] for procedure tomorrow, OT will follow up at [place] after procedure.

## 2025-04-09 NOTE — PHYSICAL THERAPY NOTE
PT order received, chart reviewed, OT  spoke to RN, pt to t/f to [facility] for procedure tomorrow, PT will follow up at [place] after procedure.

## 2025-04-09 NOTE — SLP NOTE
SPEECH DAILY NOTE - INPATIENT    ASSESSMENT & PLAN   ASSESSMENT  Pt seen for dysphagia tx to assess tolerance with recommended diet, ensure proper utilization of aspiration precautions and provide pt/family education.  Patient received alert in bedside chair eating breakfast at time of my visit. She reported good tolerance of PO intake. Slp observed PO intake of items from breakfast tray. Bolus acceptance was adequate without evidence of anterior bolus loss. Mastication and AP bolus transit were thorough and efficient without evidence of oral residue. No overt s/s of aspiration observed and patient denied odynophagia and globus sensation across consistencies. Recommend patient continue a regular diet and thin liquids.    Patient also presented with intact expressive and receptive language. Naming and repetition were intact. Patient able to follow commands and answer yes.no questions accurately. Speech was fluent without evidence of anomia or paraphasia. Minimal dysarthria noted which did not impact speech intelligibility. Intelligibility strategies were reviewed. SLP will continue to follow to monitor progress and reinforce intelligibility strategies.    Diet Recommendations - Solids: Regular  Diet Recommendations - Liquids: Thin Liquids       Aspiration Precautions: Upright position, Small bites, Small sips  Medication Administration Recommendations: One pill at a time    Patient Experiencing Pain: No                Treatment Plan  Treatment Plan/Recommendations: Dysarthria therapy    Interdisciplinary Communication: Discussed with RN            GOALS  Goal #1 The patient will tolerate regular consistency and thin liquids without overt signs or symptoms of aspiration with 90 % accuracy over 1-2 session(s).  Met   Goal #2 The patient/family/caregiver will demonstrate understanding and implementation of aspiration precautions and swallow strategies independently over 1-2 session(s).    In Progress   Goal #3  Communication evaluation.  Met   Goal #4 Patient will implement intelligibility strategies with 80% accuracy when provided minimal cues within 3 visits.    In Progress   Goal #5     Goal #6     Goal #7     Goal #8       FOLLOW UP  Follow Up Needed (Documentation Required): Yes  SLP Follow-up Date: 04/10/25  Duration: 1 week    Session: 1      If you have any questions, please contact Bello Chacon SLP

## 2025-04-09 NOTE — DISCHARGE SUMMARY
[facility]     Breana Banks Patient Status:  Inpatient    1960 MRN [id number]   Location [facility] 6NE-A Attending No att. providers found   Hosp Day # 1 PCP Jeana Bo DO     Date of Admission: 2025  Date of Discharge:  2025     Discharge Disposition: Other Type of Health Care Facility Not Defined    Discharge Diagnosis:  #Acute R MCA CVA    #High grade R ICA stenosis, R MCA occlusion, not a candidate for thrombectomy due to low NIH   -s/p TNK   -Stroke protocol   -TTE ordered  -MRI brain ordered  -Statin  -Neurosurgery following; plan for transferred to [facility] for carotid revascularization  -Neurology following     #Hypertension     #Obesity  -BMI 39.86    History of Present Illness:   Breana Banks is a 64 year old female with history of hypertension, obesity presented with lightheadedness, floaters in right peripheral vision, left facial droop, dysarthria.      LKN 6 AM on day of admission. NIHSS 3 on arrival. TNK given at 1120. CT/CTA with findings suspicious for R MCA acute ischemic stroke and hyperdense R MCA. CTA H&N with R MCA occlusion and high grade R ICA stenosis with significant perfusion defect in R MCA territory. Due to low NIH, patient was not a candidate for endovascular mechanical thrombectomy. Patient was admitted to [facility]. She was tachycardic to 130s in the ED. EKG with sinus tachycardia.     Brief Synopsis: Patient was found to have acute right MCA CVA.  She received TNK on .  Imaging also showed high-grade right ICA stenosis, right MCA occlusion.  She is not a candidate for thrombectomy due to low NIH.  Head CT 24 hours after TNK showed no hemorrhage.  Given symptomatic severe right cervical right ICA stenosis, neurosurgery recommended revascularization.  Because patient had right MCA occlusion she was not a candidate for endarterectomy or TCAR.  Carotid stent with distal protection device with persistent antegrade flow was  recommended.  Patient was transferred to [facility] for intervention.      Lace+ Score: 39  59-90 High Risk  29-58 Medium Risk  0-28   Low Risk  Patient was referred to the [facility].    TCM Follow-Up Recommendation:  LACE 29-58: Moderate Risk of readmission after discharge from the hospital.      Procedures during hospitalization:   None    Incidental or significant findings and recommendations (brief descriptions):  See brief synopsis above    Lab/Test results pending at Discharge:   None    Consultants:  Neurosurgery  Neurology    Discharge Medication List:     Discharge Medications        CONTINUE taking these medications        Instructions Prescription details   benzonatate 200 MG Caps  Commonly known as: Tessalon      Take 1 capsule (200 mg total) by mouth 3 (three) times daily as needed for cough.   Quantity: 20 capsule  Refills: 0     lisinopril-hydroCHLOROthiazide 10-12.5 MG Tabs  Commonly known as: Zestoretic      Take 1 tablet by mouth daily.   Quantity: 90 tablet  Refills: 3     Omega-3 1000 MG Caps      Take 1,000 mg by mouth every morning.   Refills: 0     Vitamin D 1000 units Tabs      Take 1,000 Units by mouth every morning.   Refills: 0              [place]  Follow-up appointment:   No follow-up provider specified.  Appointments for Next 30 Days 4/10/2025 - 5/10/2025        Date Arrival Time Visit Type Length Department Provider     4/23/2025  4:00 PM  MYCHART EXAM [6214] 30 min [facility], [address] [place] [address] Sweetie Preston PA-C    Patient Instructions:     Please arrive 15 minutes prior to your scheduled appointment. Please also bring your Insurance card, Photo ID, and your medication bottles or a list of your current medication.    If your condition improves and this appointment is no longer needed, please contact your physician office to cancel.    Please verify with your primary care provider if your insurance requires a  referral.        Location Instructions:     Masks are optional for all patients and visitors, unless otherwise indicated. Note: A $50 fee will be charged for missed appointments or same-day cancellations. Please provide 24 hours' notice if you need to cancel or reschedule your appointment.                      Vital signs:  Temp:  [98.1 °F (36.7 °C)] 98.1 °F (36.7 °C)  Pulse:  [71-85] 82  Resp:  [14-23] 23  BP: (144-161)/(79-92) 150/85  SpO2:  [92 %-95 %] 92 %  AO: (141-157)/(68-82) 141/72    Physical Exam:    General: No acute distress   Lungs: clear to auscultation  Cardiovascular: S1, S2  Abdomen: Soft      -----------------------------------------------------------------------------------------------  PATIENT DISCHARGE INSTRUCTIONS: See electronic chart    Dylan Bo DO    Total time spent on discharge plannin minutes     The 21st Century Cures Act makes medical notes like these available to patients in the interest of transparency. Please be advised this is a medical document. Medical documents are intended to carry relevant information, facts as evident, and the clinical opinion of the practitioner. The medical note is intended as peer to peer communication and may appear blunt or direct. It is written in medical language and may contain abbreviations or verbiage that are unfamiliar.

## 2025-04-09 NOTE — TRANSFER CENTER NOTE
[facility] Note:  Reason for transfer:   Higher level of Care      Request initiated by:  Lynn STODDARD / Dr. Saldivar     Active Acute Medical issue:  acute stroke with BRENDAN stenosis and RMCA occlusion s/p TNK  No thrombectomy due to low NIHSS     Plan:  Stroke work up per [facility]  MRI pending  -180  DVT prophylaxis SCD  Will plan for carotid revascularization based on MRI imaging and on going stroke work up.         Anticipated Transfer Plan:   Spoke with [facility] bed coordinator at [facility], no ICU bed available at this time. Face sheet faxed. Imaging discs in progress. [facility] is aware of need to get patient over at some point today for a procedure that will occur tomorrow. Will closely follow up.      1745: Pt accepted by Dr. Hennessy to ICU room 8345 RN to RN # [phone]

## 2025-04-09 NOTE — PAYOR COMM NOTE
ADMISSION REVIEW     Payor: [facility]  Subscriber #:  [id number]  Authorization Number: [id number]    Admit date: 25  Admit time:  1:28 PM       REVIEW DOCUMENTATION:     ED Provider Notes        ED Provider Notes signed by Marshall Campa MD at 2025  3:52 PM       Author: Marshall Campa MD Service: Emergency Medicine Author Type: Physician    Filed: 2025  3:52 PM Date of Service: 2025 10:49 AM Status: Signed    : Marshall Campa MD (Physician)           Patient Seen in: [facility] In [place]      History     Chief Complaint   Patient presents with    Lightheadedness     Stated Complaint: lightheaded    Subjective:   The history is provided by the patient and the spouse.         64-year-old female with history of HTN and obesity presents to the ER with complaints of floaters in her right peripheral vision that have subsided with associated left facial droop and mild dysarthria, and dry mouth.  Denies any headache, neck pain, back pain, chest pain, extremity weakness, or paresthesias.  No prior episodes.  Patient says she woke up this morning at 6:00 AM and felt fine, symptom onset around 8:00 AM.    Objective:     Past Medical History:    Acute upper respiratory infections of unspecified site    Asymptomatic postmenopausal status (age-related) (natural)    Body piercing    Essential hypertension, benign    Localized superficial swelling, mass, or lump    Normal delivery (HCC)    Streptococcal sore throat    Undiagnosed cardiac murmurs              Past Surgical History:   Procedure Laterality Date          Tonsillectomy      Tubal ligation  02         Physical Exam     ED Triage Vitals   BP 25 1032 (!) 177/86   Pulse 25 1032 108   Resp 25 1032 20   Temp 25 1032 99.2 °F (37.3 °C)   Temp src 25 1032 Temporal   SpO2 25 1032 97 %   O2 Device 25 1100 None (Room air)       Current Vitals:   Vital Signs  BP:  159/83  Pulse: 102  Resp: 22  Temp: 99 °F (37.2 °C)  Temp src: Temporal    Oxygen Therapy  SpO2: 98 %  O2 Device: None (Room air)        Physical Exam  Vitals and nursing note reviewed.   Constitutional:       General: She is not in acute distress.     Appearance: She is well-developed. She is not ill-appearing.   HENT:      Head: Normocephalic and atraumatic.   Eyes:      Extraocular Movements: Extraocular movements intact.      Pupils: Pupils are equal, round, and reactive to light.   Cardiovascular:      Rate and Rhythm: Normal rate and regular rhythm.      Pulses: Normal pulses.      Heart sounds: Normal heart sounds.   Pulmonary:      Effort: Pulmonary effort is normal. No respiratory distress.      Breath sounds: Normal breath sounds.   Abdominal:      General: Abdomen is flat. There is no distension.      Palpations: Abdomen is soft.      Tenderness: There is no abdominal tenderness.   Musculoskeletal:      Cervical back: Neck supple.   Skin:     General: Skin is dry.   Neurological:      Mental Status: She is alert and oriented to person, place, and time.      Comments: Mild flattening of the left nasolabial fold, symmetric smile   Psychiatric:         Mood and Affect: Mood normal.         Behavior: Behavior normal.             ED Course     Labs Reviewed   COMP METABOLIC PANEL (14) - Abnormal; Notable for the following components:       Result Value    Glucose 143 (*)     Sodium 133 (*)     Potassium 3.2 (*)     Total Protein 8.3 (*)     Albumin 4.9 (*)     All other components within normal limits   POCT GLUCOSE - Abnormal; Notable for the following components:    POC Glucose 135 (*)     All other components within normal limits   POCT ISTAT G4 CARTRIDGE - Abnormal; Notable for the following components:    ISTAT Blood Gas TCO2 21 (*)     All other components within normal limits   PROTHROMBIN TIME (PT) - Normal   PTT, ACTIVATED - Normal   TROPONIN I HIGH SENSITIVITY - Normal   CBC WITH DIFFERENTIAL WITH  PLATELET   VENOUS BLOOD GAS   TYPE AND SCREEN     EKG    Rate, intervals and axes as noted on EKG Report.  Rate: 106  Rhythm: Sinus Rhythm  Reading: Sinus tachycardia, LVH, left axis deviation, Q waves in III and aVF           ED Course as of 04/08/25 1200  ------------------------------------------------------------  Time: 04/08 1056  Comment: Discussed case with neurology, Dr. Art, plan to offer/consent TNK for patient.  Recommends transfer to main  for CT angio.  ------------------------------------------------------------  Time: 04/08 1118  Comment: Discussed CT findings with neurology, Dr. Dubois, we confirm patient's symptom onset at 8:00 AM, recommends TNK and transferred to [facility] for CTA and potential endovascular treatment.  ------------------------------------------------------------  Time: 04/08 1121  Comment: Notified neuro IR regarding case, plan to give TN K and plan to transfer for CTA imaging  ------------------------------------------------------------  Time: 04/08 1131  Comment: I independently interpreted labs, mild hypokalemia, mild hyponatremia noted.  Troponin negative.       XR CHEST AP PORTABLE  (CPT=71045)    Result Date: 4/8/2025  CONCLUSION:  There is no evidence of active cardiopulmonary disease on this single portable chest radiograph.   LOCATION:  [facility]      Dictated by (CST): Win Hardy MD on 4/08/2025 at 11:22 AM     Finalized by (CST): Win Hardy MD on 4/08/2025 at 11:23 AM       CT BRAIN OR HEAD (CPT=70450)    Result Date: 4/8/2025  CONCLUSION:  1. There are findings suspicious for an acute right MCA territory stroke with hyperdense branch of right MCA in the sylvian fissure and decreased attenuation in the right insular cortex.  Additional evaluation with MRI is recommended. 2. There is no evidence of hemorrhage.  Above findings were discussed with Dr. Campa on April 8, 2025 at 11:10 a.m..    LOCATION:  [facility]   Dictated by (CST): Win Hardy MD on 4/08/2025 at  11:05 AM     Finalized by (CST): Win Hardy MD on 4/08/2025 at 11:10 AM            [facility]      Differential diagnosis includes TIA, CVA, complex migraine, Bell's palsy, central retinal artery occlusion, mocks fugax    Admission disposition: 4/8/2025 11:26 AM           Medical Decision Making  Problems Addressed:  Acute CVA (cerebrovascular accident) (HCC): complicated acute illness or injury     Details: Acute onset dysarthria, left lower facial droop, NIH stroke scale was 2, last known normal 8:00 AM  CT head suggest MCA stroke with hyperdense lesion on the right side, CTA is unavailable at this time so we will transfer to main ER for CTA  Patient does qualify for TNK, she was consented and agreeable, TNK administered prior to transport    Amount and/or Complexity of Data Reviewed  Labs: ordered. Decision-making details documented in ED Course.  Radiology: ordered.     Details: Reviewed radiology report, discussed with neurology radiologist, right MCA stroke with hyperdense lesion suggesting LVO  Discussion of management or test interpretation with external provider(s): Discussed case with neurology, Dr. Art, discussed case with neurointerventional Dr. Saldivar.    Risk  Prescription drug management.  Decision regarding hospitalization.  Diagnosis or treatment significantly limited by social determinants of health.  Minor surgery with no identified risk factors.    Critical Care  Total time providing critical care: 50 minutes (Upon my evaluation, this patient had a high probability of imminent or life-threatening deterioration due to central nervous system failure and acute CVA/stroke , which required my direct attention, intervention, and personal management.    I have personally provided 50 minutes of critical care time, exclusive of time spent on separately billable procedures.  Time includes review of all pertinent laboratory/radiology results, discussion with consultants, and monitoring for potential  decompensation.  Performed interventions included initiation of Thrombolytics  )        Disposition and Plan     Clinical Impression:  1. Acute CVA (cerebrovascular accident) (HCC)         Disposition:  Admit  4/8/2025 11:26 am               ED Provider Notes signed by Carl Donohue MD at 4/8/2025  1:24 PM       Author: Carl Donohue MD Service: Emergency Medicine Author Type: Physician    Filed: 4/8/2025  1:24 PM Date of Service: 4/8/2025 12:49 PM Status: Signed    : Carl Donohue MD (Physician)         Patient was sent from [place] emergency room..  The patient was brought in from [place] emergency room patient had slurred speech starting tomorrow this this morning.  The patient became had slurred speech and because of the slurred speech was sent to the emergency room patient arrived there had slurred speech left facial droop.  Discussed the risk and benefits of doing TNK.  TNK was given.  Was sent here for further evaluation.  As it did not have a CTA machine there.  Patient had already got TNK by time I saw her the patient had some slight slurred speech.  She does have a little place left facial droop.  No focal weakness that I can see at this present time.  NIH score of 2.  The family states that speech is not any better than when she was at home.  The patient's case was discussed with Michelle for neuro.  The patient case will be discussed with the neuro interventionalists.  Who will actually see the patient in consultation.  CTA was done which showed does show a large vessel occlusion of the right MCA.    There was also some other abnormality seen on the CTA.      XR CHEST AP PORTABLE  (CPT=71045)    Result Date: 4/8/2025  PROCEDURE:  XR CHEST AP PORTABLE  (CPT=71045)  TECHNIQUE:  AP chest radiograph was obtained.  COMPARISON:  None.  INDICATIONS:  lightheaded  PATIENT STATED HISTORY: (As transcribed by Technologist)  This morning patient had onset of slurred speech and states  her \"head didn't feel right\".  She denies any chest pain or specific dizziness.  She does complain of a floater in her right eye.    FINDINGS:  Lungs and pleural spaces are clear.  Cardiac size is within normal limits.  Mediastinum and sergo are unremarkable.  Chest wall structures are unremarkable.            CONCLUSION:  There is no evidence of active cardiopulmonary disease on this single portable chest radiograph.   LOCATION:  [facility]      Dictated by (CST): Win Hardy MD on 4/08/2025 at 11:22 AM     Finalized by (CST): Win Hardy MD on 4/08/2025 at 11:23 AM       CT BRAIN OR HEAD (CPT=70450)    Result Date: 4/8/2025  PROCEDURE:  CT BRAIN OR HEAD (30383)  COMPARISON:  None.  INDICATIONS:  lightheaded  TECHNIQUE:  Noncontrast CT scanning is performed through the brain. Dose reduction techniques were used. Dose information is transmitted to the ACR (American College of Radiology) NRDR (National Radiology Data Registry) which includes the Dose Index Registry.  PATIENT STATED HISTORY: (As transcribed by Technologist)  Patient complains of dizziness.    FINDINGS:  VENTRICLES/SULCI:  Ventricles and sulci are normal in size.  INTRACRANIAL:  Focal hyperdensity in the right sylvian fissure series 3, image 17 could represent a hyperdense M2 or M3 branch.  There is associated decreased attenuation in the right insular cortex (series 3, image 18).  The findings are suspicious for an acute right MCA stroke.  Correlation with clinical findings is necessary.  There is no evidence of hemorrhage or mass effect related to this finding. SINUSES:           There is mucosal thickening in the maxillary sinuses bilaterally. MASTOIDS:          No sign of acute inflammation. SKULL:             No evidence for fracture or osseous abnormality. OTHER:             None.            CONCLUSION:  1. There are findings suspicious for an acute right MCA territory stroke with hyperdense branch of right MCA in the sylvian fissure and decreased  attenuation in the right insular cortex.  Additional evaluation with MRI is recommended. 2. There is no evidence of hemorrhage.  Above findings were discussed with Dr. Campa on April 8, 2025 at 11:10 a.m..    LOCATION:  [facility]   Dictated by (CST): Win Hardy MD on 4/08/2025 at 11:05 AM     Finalized by (CST): Win Hardy MD on 4/08/2025 at 11:10 AM          Talk to the radiologist.  He did official reading is pending but did show what seems to be is a right MCA large vessel occlusion and possibly left-sided distal loop occlusions in the right c carotid occlusion.  Dr. Urban fermented see the patient consultation he stated the patient does not meet criteria to do interventions and a score is 2.  But will need to go to the neuro ICU.  Patient was admitted directly to the neuro ICU.        4/8        NEUROSURGERY:       Neurosurgery Consult           REASON FOR CONSULTATION:  Stroke alert     BRENDAN stenosis  Right MCA occlusion/stenosis     HISTORY OF PRESENT ILLNESS:  Breana Banks is a(n) 64 year old female with a PMH of HTN who presented with right eye visual changes which subsided. She then developed left droop and left leg weakness. TNK was administered. CT negative for hemorrhage. CTA H/N shows BRENDAN stenosis and RMCA occlusion. CTP shows ischemia in the RMCA territory.      Currently patient has slurred speech and left droop.  is at bedside. Dr Saldivar at bedside to discuss recommendations.        PHYSICAL EXAMINATION:  VITAL SIGNS: /80   Pulse (!) 133   Temp 98.4 °F (36.9 °C) (Temporal)   Resp 20       GENERAL:  Patient is a 64 year old female in no acute distress.  HEENT:  Normocephalic, atraumatic.  NEUROLOGICAL:  This patient is alert and orientated x 3.  Slurred speech and left droop. Comprehension intact.   PERRLA +3 brisk,  EOMI.  Face is symmetrical. CN's GI.  Sensation to light touch is intact bilaterally.  No Pronator Drift.  Strengths 5/5 bilaterally. Finger-to-nose coordination is  intact.  Gait deferred.            CT BRAIN OR HEAD (CPT=70450)     Result Date: 4/8/2025  CONCLUSION:  1. There are findings suspicious for an acute right MCA territory stroke with hyperdense branch of right MCA in the sylvian fissure and decreased attenuation in the right insular cortex.  Additional evaluation with MRI is recommended. 2. There is no evidence of hemorrhage.  Above findings were discussed with Dr. Campa on April 8, 2025 at 11:10 a.m..    LOCATION:  [facility]   Dictated by (CST): Win Hardy MD on 4/08/2025 at 11:05 AM     Finalized by (CST): Win Hardy MD on 4/08/2025 at 11:10 AM          ASSESSMENT:  65 yo female with BRENDAN stenosis and RMCA occlusion     Dr Saldivar at bedside to discuss the risks, benefits and alternatives to doing a mechanical thrombectomy procedure vs watching symptoms with delayed carotid revascularization vs no treatment. Due to the low NIHSS, the risk doing a mechanical thrombectomy could possibly increase risk of worsening stroke symptoms as opposed to monitoring with medical management.  We discussed the possibility of performing carotid revascularization at a later time.     Plan:  Admit to neuro ICU  Medical management per neurocritical care/hospitalist  Maintain systolic blood pressure between 140 and 200  Monitor blood pressure with arterial line  Maintain flat bedrest unless there is any respiratory compromise, until stabilized  Stroke up workup per neurocritical care               Attestation with edits by Pk Saldivar MD at 4/9/2025  8:57 AM     Patient examined and assessed. Agree with above.      She presented with acute onset left facial droop and left leg weakness.  Head CT was negative for acute abnormality.  CTA head and neck demonstrated right distal M1 occlusion and right cervical internal carotid artery origin severe stenosis.  CT perfusion demonstrated right hemisphere penumbra.     TNK was administered.      Upon my neurological examination she  has mild dysarthria and trace left facial asymmetry.     I spoke to the patient and her  regarding the current clinical situation and plan of care.  I explained that given her low NIH stroke scale, despite her large vessel occlusion, that I do not believe that she should have emergent neurovascular intervention as we could make her symptoms significantly worse.  I explained that we will monitor closely in her neurological ICU and should symptoms worsen we will plan for emergent thrombectomy.  We will also need to consider revascularization options of her symptomatic severe carotid stenosis once we ensure that she does not have unstable cerebrovascular insufficiency.  After this discussion and answering their questions, they expressed understanding and agreement with the plan.     Will augment blood pressure to maintain cerebral perfusion  Close neurological monitoring   Head of bed flat     Upon my evaluation, this patient had a high probability of imminent neurological deterioration due to acute CVA/stroke , which required my direct attention, intervention, and personal management.     I have personally provided 45 minutes of critical care time, exclusive of time spent on separately billable procedures.  Time includes review of all pertinent laboratory/radiology results, discussion with consultants, and monitoring for potential decompensation.  Performed interventions included physiologic optimization for the prevention of neurological decline from a neurosurgical perspective in collaboration with neurocritical care.                     Cosigned by: Pk Saldivar MD at 4/9/2025  8:57 AM                 .HOSPITALIST:       History and Physical           Chief Complaint: R eye vision floaters and lightheaded     Subjective:  History of Present Illness:      Breana Banks is a 64 year old female with history of hypertension, obesity presented with lightheadedness, floaters in right peripheral vision, left  facial droop, dysarthria.      LKN 6 AM on day of admission. NIHSS 3 on arrival. TNK given at 1120. CT/CTA with findings suspicious for R MCA acute ischemic stroke and hyperdense R MCA. CTA H&N with R MCA occlusion and high grade R ICA stenosis with significant perfusion defect in R MCA territory. Due to low NIH, patient was not a candidate for endovascular mechanical thrombectomy. Patient was admitted to NICU. She was tachycardic to 130s in the ED. EKG with sinus tachycardia.          Assessment & Plan:  #Acute R MCA CVA s/p TNK 4/8   #High grade R ICA stenosis, R MCA occlusion, not a candidate for thrombectomy due to low NIH   -Neuro critical care following  -TNK protocol  -Stroke protocol   -Stroke labs, TTE pending   -Repeat CT at 24 hours  -Statin     #Hypokalemia - supplement per procol      #Mild hyponatremia - trend      #Sinus tachycardia - improved      #Hypertension-   #obesity, BMI 39.86              4/9         NEUROSURGERY:         Chief Complaint:  Right MCA occlusion  BRENDAN stenosis      Assessment:  65 yo acute stroke with BRENDAN stenosis and RMCA occlusion s/p TNK  No thrombectomy due to low NIHSS     Plan:  Stroke work up per [facility]  MRI pending  -180  DVT prophylaxis SCD  Will plan for carotid revascularization based on MRI imaging and on going stroke work up.      Is this a shared or split note between Advanced Practice Provider and Physician? Yes         MARIA TERESA Butterfield  [facility]  4/9/2025, 8:54 AM  [place] [id number]           Attestation signed by Pk Saldivar MD at 4/9/2025 11:11 AM (Updated)     Patient examined and assessed. Agree with above.      Dysarthria improved overnight and now has a trace left facial asymmetry.  She is otherwise neurologically intact on my exam while upright in a chair.     She is auto pressing and not required pressors.     Head CT 24hr post TNK without hemorrhage.     Given her neurological stability, there is no indication for acute  mechanical thrombectomy at this time.  Given her symptomatic severe right cervical internal carotid artery origin stenosis, revascularization is indicated.   given the right MCA occlusion, I do not think that she is a candidate for this endarterectomy or a TCAR and that I think a carotid stent with distal protection device with persistent antegrade flow is the ideal intervention for her carotid stenosis.  Our biplane angio suite is down at this time, and therefore we will facilitate transfer to [facility] for this intervention.  I spoke to my neuroendovascular colleague, Dr. Hennessy, who is available to care for and transfer.       I spoke to the patient regarding the current clinical situation and plan of care.  After this discussion answering her question, she expressed understanding and agreement with the plan.                           NEURO:         Feels speech improved, almost back to baseline.     Vitals:   Temp:  [97.7 °F (36.5 °C)-99.2 °F (37.3 °C)] 98.6 °F (37 °C)  Pulse:  [] 75  Resp:  [13-34] 17  BP: (133-177)/() 150/79  SpO2:  [91 %-100 %] 94 %      Physical Examination:   General- No acute distress  CV- RRR  Resp- CTA Bilat  Neuro-  Mental status- awake and alert, regards and follows commands, oriented x3, speech fluent with very mild dysarthria  Cranial nerves- pupils equally round and reactive to light, extraocular muscles intact, face symmetric, visual fields full  Motor- 5/5 throughout  Sens-  Intact to light touch          Assesment/Plan:   64 year old female with a h/o htn p/w R mca acute ischemic stroke 4/8. Pt had onset of vision changes, facial droop and slurred speech thus came to  ED where w/u revealed NIHSS 2, ct/cta with findings suspicious for R mca AIS and hyperdense R mca thus given iv tnk and transferred to  where CTA/P revealed R mca occlusion and high grade R ica stenosis with significant perfusion defect in R mca territory, but due to low NIH pt was not a  candidate for endovascular mechanical thrombectomy per [facility]      Neuro:  R mca AIS- 2/2 high grade R ica stenosis and R mca occlusion.  Pt with risk factors of htn and newly diagnosed carotid stenosis.  Now s/p iv tnk, cont post tnk protocol with neurochecks/pt/ot/st.   Pt not a candidate for mechanical thrombectomy per [facility] due to low NIH, will cont to monitor exam and if worsens may re-consider at that time.  Rpt ct 24hrs post tnk (or sooner if any clinical decompensation) and complete stroke w/u (tte, mri etc).   Carotid stenosis- management per [facility], possible tx to OSH for revascularization.  Cardiac:  Htn/hl- sbp goal 100-180 post TNK, check tte with bubble to eval for source of infarct, cont statin  Pulmonary:  Stable on RA  Renal:  IVFs, monitor BUN/Cr  GI:  PO as tolerated  Heme/ID:  Afebrile, trend leukocytosis  Endocrine/Rheum:  Monitor glucose, sliding scale insulin prn  DVT Prophylaxis:  SCD’s    Goals of the Day: neurochecks, post tnk protocol           HOSPITALIST:         Chief Complaint: Vision changes, lightheadedness         Assessment & Plan:  #Acute R MCA CVA    #High grade R ICA stenosis, R MCA occlusion, not a candidate for thrombectomy due to low NIH   -s/p TNK 4/8  -Stroke protocol   -TTE ordered  -MRI brain ordered  -Statin  -Neurosurgery following; plan for transferred to [facility] for carotid revascularization  -Neurology following     #Hypertension     #Obesity  -BMI 39.86          MEDICATIONS ADMINISTERED IN LAST 1 DAY:  atorvastatin (Lipitor) tab 40 mg       Date Action Dose Route User    4/8/2025 2009 Given 40 mg Oral Sanaz Arita, MABEL          potassium chloride (Klor-Con M20) tab 40 mEq       Date Action Dose Route User    4/9/2025 1040 Given 40 mEq Oral Mary Newman RN    4/9/2025 0637 Given 40 mEq Oral Sanaz Arita, MABEL            Vitals (last day)       Date/Time Temp Pulse Resp BP SpO2 Weight O2 Device O2 Flow Rate (L/min) [facility]    04/09/25 1400 -- 77 17  161/84 94 % -- -- -- SB    04/09/25 1300 -- 83 17 -- 96 % -- -- -- SB    04/09/25 1200 98.3 °F (36.8 °C) 76 19 -- 96 % -- None (Room air) -- SB    04/09/25 1100 -- 81 20 159/83 95 % -- -- -- SB    04/09/25 1000 -- 82 20 -- 95 % -- -- -- SB    04/09/25 0900 -- 83 16 -- 96 % -- -- -- SB    04/09/25 0800 98.6 °F (37 °C) 75 17 150/79 94 % -- None (Room air) -- SB    04/09/25 0710 -- 78 20 -- 94 % -- -- -- FS    04/09/25 0700 -- 88 34 164/85 92 % -- -- -- FS    04/09/25 0600 -- 83 17 155/83 95 % 261 lb (118.4 kg) -- -- FS    04/09/25 0500 -- 89 14 152/81 96 % -- -- -- FS    04/09/25 0400 98.5 °F (36.9 °C) 84 20 142/90 94 % -- None (Room air) -- FS    04/09/25 0300 -- 79 15 153/79 93 % -- -- -- FS 04/09/25 0200 -- 80 15 163/83 96 % -- -- -- FS 04/09/25 0100 -- 78 18 155/83 95 % -- -- -- FS 04/09/25 0000 98.5 °F (36.9 °C) 86 17 146/87 94 % -- None (Room air) -- FS    04/08/25 2300 -- 79 15 151/86 94 % -- -- -- FS    04/08/25 2200 -- 82 18 149/78 91 % -- -- -- FS    04/08/25 2100 -- 86 16 155/84 93 % -- -- -- FS    04/08/25 2000 98 °F (36.7 °C) 90 19 149/74 94 % -- None (Room air) -- FS    04/08/25 1930 -- 97 24 143/85 90 % -- -- -- SB    04/08/25 1900 -- 93 19 133/121 92 % -- -- -- SB    04/08/25 1830 -- 89 21 162/79 95 % -- -- -- SB    04/08/25 1800 -- 87 13 175/107 94 % -- -- -- SB    04/08/25 1730 -- 92 19 157/76 93 % -- -- -- SB    04/08/25 1700 -- 94 15 153/86 96 % -- -- -- SB    04/08/25 1630 -- 94 17 171/87 94 % -- -- -- SB    04/08/25 1600 98.2 °F (36.8 °C) 97 19 169/96 94 % -- None (Room air) -- SB    04/08/25 1530 -- 94 18 161/93 96 % -- -- -- SB    04/08/25 1500 -- 97 15 173/90 93 % -- -- -- SB    04/08/25 1430 -- 97 15 173/85 96 % -- -- -- SB    04/08/25 1400 -- 96 21 177/89 97 % -- -- -- SB    04/08/25 1338 97.7 °F (36.5 °C) 96 18 172/89 98 % 262 lb 2 oz (118.9 kg) None (Room air) -- SB    04/08/25 1320 -- 118 18 134/84 100 % -- -- -- AW    04/08/25 1315 -- 118 19 151/84 99 % -- None (Room air) -- AW     04/08/25 1310 -- 131 23 149/94 99 % -- -- -- AW    04/08/25 1300 -- 124 23 143/84 98 % -- -- -- AW    04/08/25 1245 -- 133 20 160/80 98 % -- None (Room air) --     04/08/25 1230 -- 127 17 148/92 100 % -- None (Room air) --     04/08/25 1215 -- 122 20 151/82 100 % -- None (Room air) --     04/08/25 1200 98.4 °F (36.9 °C) 98 19 153/87 97 % -- None (Room air) --     04/08/25 1158 -- 100 23 151/87 100 % -- -- --     04/08/25 1113 -- 102 22 159/83 98 % -- None (Room air) --     04/08/25 1100 -- 95 16 165/89 100 % -- None (Room air) -- LM    04/08/25 1057 99 °F (37.2 °C) 105 20 159/83 -- -- -- -- HG    04/08/25 1032 99.2 °F (37.3 °C) 108 20 177/86 97 % -- -- -- SC    04/08/25 1032 -- -- -- -- -- 259 lb 11.2 oz (117.8 kg) -- -- LM

## 2025-04-09 NOTE — CM/SW NOTE
Informed by Bryant Saldivar and Kaelyn regarding need for transfer to [facility]--accepting physician--Dr Hennessy--information given to Lonny in transfer center

## 2025-04-09 NOTE — PLAN OF CARE
Assumed care of pt around 1930. NQ1H. Pt A&Ox4, off/on slurred speech. See Complex Neuro Assessment for more details. Maintaining SBP < 180 and DBP < 105. Pt on RA, NSR on tele, HR 70s. Pt ambulating within room and up to bathroom w/ standby assist. Great PO intake. Voiding in bathroom. Pt denies pain.  at bedside at start of shift. Pt and  updated on plan of care.       Problem: NEUROLOGICAL - ADULT  Goal: Achieves stable or improved neurological status  Description: INTERVENTIONS- Assess for and report changes in neurological status- Initiate measures to prevent increased intracranial pressure- Maintain blood pressure and fluid volume within ordered parameters to optimize cerebral perfusion and minimize risk of hemorrhage- Monitor temperature, glucose, and sodium. Initiate appropriate interventions as ordered  Outcome: Progressing  Goal: Absence of seizures  Description: INTERVENTIONS- Monitor for seizure activity- Administer anti-seizure medications as ordered- Monitor neurological status  Outcome: Progressing  Goal: Remains free of injury related to seizure activity  Description: INTERVENTIONS:- Maintain airway, patient safety  and administer oxygen as ordered- Monitor patient for seizure activity, document and report duration and description of seizure to MD/LIP- If seizure occurs, turn patient to side and suction secretions as needed- Reorient patient post seizure- Seizure pads on all 4 side rails- Instruct patient/family to notify RN of any seizure activity- Instruct patient/family to call for assistance with activity based on assessment  Outcome: Progressing  Goal: Achieves maximal functionality and self care  Description: INTERVENTIONS- Monitor swallowing and airway patency with patient fatigue and changes in neurological status- Encourage and assist patient to increase activity and self care with guidance from PT/OT- Encourage visually impaired, hearing impaired and aphasic patients to use  assistive/communication devices  Outcome: Progressing     Problem: HEMATOLOGIC - ADULT  Goal: Free from bleeding injury  Description: (Example usage: patient with low platelets)INTERVENTIONS:- Avoid intramuscular injections, enemas and rectal medication administration- Ensure safe mobilization of patient- Hold pressure on venipuncture sites to achieve adequate hemostasis- Assess for signs and symptoms of internal bleeding- Monitor lab trends- Patient is to report abnormal signs of bleeding to staff- Avoid use of toothpicks and dental floss- Use electric shaver for shaving- Use soft bristle tooth brush- Limit straining and forceful nose blowing  Outcome: Progressing

## 2025-04-09 NOTE — BH PROGRESS NOTE
[facility]  Neurocritical Care Progress Note     Breana Banks Patient Status:  Inpatient    1960 MRN [id number]   Location [facility] 6NE-A Attending Pk Saldivar MD   Hosp Day # 1 PCP Jeana Bo DO     Subjective:     No acute events overnight. Feels speech improved, almost back to baseline.    Vitals:   Temp:  [97.7 °F (36.5 °C)-99.2 °F (37.3 °C)] 98.6 °F (37 °C)  Pulse:  [] 75  Resp:  [13-34] 17  BP: (133-177)/() 150/79  SpO2:  [91 %-100 %] 94 %  AO: (148-212)/() 155/68  Body mass index is 39.68 kg/m².    Intake/Output:    Intake/Output Summary (Last 24 hours) at 2025 0857  Last data filed at 2025 0600  Gross per 24 hour   Intake 3298.8 ml   Output 1550 ml   Net 1748.8 ml     Current Meds:  Current Hospital Medications[1]  Physical Examination:   General- No acute distress  CV- RRR  Resp- CTA Bilat  Neuro-  Mental status- awake and alert, regards and follows commands, oriented x3, speech fluent with very mild dysarthria  Cranial nerves- pupils equally round and reactive to light, extraocular muscles intact, face symmetric, visual fields full  Motor- 5/5 throughout  Sens-  Intact to light touch    Diagnostics:   CT STROKE (ABI) CTA BRAIN/CTA NECK+PERF(CPT=70496/97359/0042T)  Result Date: 2025  CONCLUSION:  1. Large vessel occlusion involving 2 of 3 branches at the right MCA trifurcation.  There is associated severe perfusion defect which involves the majority of the right MCA territory.  There is no corresponding cerebral blood flow defect which I suspect is artifact based on appearance on the noncontrast CT although there is certainly a large area of ischemic penumbra. 2. Severe stenosis or near occlusion cervical right internal carotid artery at the origin of the vessel. 3. There is also a perfusion defect in the left frontal lobe which would be peripheral anterior MCA territory.  There may be occlusion of smaller vessel which would be a 4th  order branch of left MCA anteriorly as there is hypoperfusion of this segment noted with absence of small vessels.  The exact occluded branch cannot be identified on this study. 4. There is hypodensity again noted right insular cortex and around the right sylvian fissure which is probably infarct related to the MCA occlusion on the right.  Above findings were discussed with Dr. Olvera on April 8, 2025 at 12:45 p.m..    LOCATION:  [facility]   Dictated by (CST): Win Hardy MD on 4/08/2025 at 12:26 PM     Finalized by (CST): Win Hardy MD on 4/08/2025 at 12:45 PM       XR CHEST AP PORTABLE  (CPT=71045)  Result Date: 4/8/2025  CONCLUSION:  There is no evidence of active cardiopulmonary disease on this single portable chest radiograph.   LOCATION:  [facility]      Dictated by (CST): Win Hardy MD on 4/08/2025 at 11:22 AM     Finalized by (CST): Win Hardy MD on 4/08/2025 at 11:23 AM       CT BRAIN OR HEAD (CPT=70450)  Result Date: 4/8/2025  CONCLUSION:  1. There are findings suspicious for an acute right MCA territory stroke with hyperdense branch of right MCA in the sylvian fissure and decreased attenuation in the right insular cortex.  Additional evaluation with MRI is recommended. 2. There is no evidence of hemorrhage.  Above findings were discussed with Dr. Campa on April 8, 2025 at 11:10 a.m..    LOCATION:  [facility]   Dictated by (CST): Win Hardy MD on 4/08/2025 at 11:05 AM     Finalized by (CST): Win Hardy MD on 4/08/2025 at 11:10 AM       Lab Review     Recent Labs   Lab 04/08/25  1049 04/09/25  0426   * 135*   K 3.2* 3.6  3.6   CL 98 101   CO2 23.0 25.0   * 113*   BUN 11 8*   CREATSERUM 0.83 0.78     Recent Labs   Lab 04/08/25  1049 04/09/25  0426   WBC 9.2 11.4*   HGB 14.8 12.6   .0 316.0     Assesment/Plan:   64 year old female with a h/o htn p/w R mca acute ischemic stroke 4/8. Pt had onset of vision changes, facial droop and slurred speech thus came to  ED where w/u  revealed NIHSS 2, ct/cta with findings suspicious for R mca AIS and hyperdense R mca thus given iv tnk and transferred to  where CTA/P revealed R mca occlusion and high grade R ica stenosis with significant perfusion defect in R mca territory, but due to low NIH pt was not a candidate for endovascular mechanical thrombectomy per MAXIME     Neuro:  R mca AIS- 2/2 high grade R ica stenosis and R mca occlusion.  Pt with risk factors of htn and newly diagnosed carotid stenosis.  Now s/p iv tnk, cont post tnk protocol with neurochecks/pt/ot/st.   Pt not a candidate for mechanical thrombectomy per MAXIME due to low NIH, will cont to monitor exam and if worsens may re-consider at that time.  Rpt ct 24hrs post tnk (or sooner if any clinical decompensation) and complete stroke w/u (tte, mri etc).   Carotid stenosis- management per MAXIME, possible tx to OSH for revascularization.  Cardiac:  Htn/hl- sbp goal 100-180 post TNK, check tte with bubble to eval for source of infarct, cont statin  Pulmonary:  Stable on RA  Renal:  IVFs, monitor BUN/Cr  GI:  PO as tolerated  Heme/ID:  Afebrile, trend leukocytosis  Endocrine/Rheum:  Monitor glucose, sliding scale insulin prn  DVT Prophylaxis:  SCD’s    Goals of the Day: neurochecks, post tnk protocol   Upon my evaluation, this patient had a high probability of imminent or life-threatening deterioration due to central nervous system failure and acute CVA/stroke , which required my direct attention, intervention, and personal management.     I have personally provided 40 minutes of critical care time, exclusive of time spent on separately billable procedures.  Time includes review of all pertinent laboratory/radiology results, discussion with consultants, and monitoring for potential decompensation.  Performed interventions included initiation of Thrombolytics.      Thank you for allowing me to participate in the care of this patient.      Puneet Art MD, [facility]  Medical Director of System  Neurosciences  Chief, Section of Neurocritical Care  Critical access hospital Neuroscience [place]           [1]   Current Facility-Administered Medications   Medication Dose Route Frequency    potassium chloride (Klor-Con M20) tab 40 mEq  40 mEq Oral Q4H    acetaminophen (Tylenol) tab 650 mg  650 mg Oral Q4H PRN    Or    acetaminophen (Tylenol) rectal suppository 650 mg  650 mg Rectal Q4H PRN    labetalol (Trandate) 5 mg/mL injection 10 mg  10 mg Intravenous Q10 Min PRN    hydrALAzine (Apresoline) 20 mg/mL injection 10 mg  10 mg Intravenous Q2H PRN    niCARdipine in sodium chloride 0.86% (carDENE) 20 mg/200mL infusion premix  5-15 mg/hr Intravenous Continuous PRN    ondansetron (Zofran) 4 MG/2ML injection 4 mg  4 mg Intravenous Q6H PRN    metoclopramide (Reglan) 5 mg/mL injection 10 mg  10 mg Intravenous Q8H PRN    atorvastatin (Lipitor) tab 40 mg  40 mg Oral Nightly

## 2025-04-11 NOTE — PAYOR COMM NOTE
--------------  DISCHARGE REVIEW    Payor: [facility]  Subscriber #:  [id number]  Authorization Number: [id number]    Admit date: 25  Admit time:   1:28 PM  Discharge Date: 2025  6:49 PM     Admitting Physician: Kenney Ayers MD  Attending Physician:  No att. providers found  Primary Care Physician: Jeana Bo DO          Discharge Summary Notes        Discharge Summary signed by Dylan Bo DO at 4/10/2025  1:53 PM       Author: Dylan Bo DO Specialty: HOSPITALIST Author Type: Physician    Filed: 4/10/2025  1:53 PM Date of Service: 2025  6:13 PM Status: Signed    : Dylan Bo DO (Physician)           [facility]     Breana Banks Patient Status:  Inpatient    1960 MRN [id number]   Location [facility] 6NE-A Attending No att. providers found   Hosp Day # 1 PCP Jeana Bo DO     Date of Admission: 2025  Date of Discharge:  2025     Discharge Disposition: Other Type of Health Care Facility Not Defined    Discharge Diagnosis:  #Acute R MCA CVA    #High grade R ICA stenosis, R MCA occlusion, not a candidate for thrombectomy due to low NIH   -s/p TNK   -Stroke protocol   -TTE ordered  -MRI brain ordered  -Statin  -Neurosurgery following; plan for transferred to [facility] for carotid revascularization  -Neurology following     #Hypertension     #Obesity  -BMI 39.86    History of Present Illness:   Breana Banks is a 64 year old female with history of hypertension, obesity presented with lightheadedness, floaters in right peripheral vision, left facial droop, dysarthria.      LKN 6 AM on day of admission. NIHSS 3 on arrival. TNK given at 1120. CT/CTA with findings suspicious for R MCA acute ischemic stroke and hyperdense R MCA. CTA H&N with R MCA occlusion and high grade R ICA stenosis with significant perfusion defect in R MCA territory. Due to low NIH, patient was not a candidate for endovascular mechanical thrombectomy. Patient was  admitted to NICU. She was tachycardic to 130s in the ED. EKG with sinus tachycardia.     Brief Synopsis: Patient was found to have acute right MCA CVA.  She received TNK on 4/8.  Imaging also showed high-grade right ICA stenosis, right MCA occlusion.  She is not a candidate for thrombectomy due to low NIH.  Head CT 24 hours after TNK showed no hemorrhage.  Given symptomatic severe right cervical right ICA stenosis, neurosurgery recommended revascularization.  Because patient had right MCA occlusion she was not a candidate for endarterectomy or TCAR.  Carotid stent with distal protection device with persistent antegrade flow was recommended.  Patient was transferred to [facility] for intervention.      Lace+ Score: 39  59-90 High Risk  29-58 Medium Risk  0-28   Low Risk  Patient was referred to the [facility].    TCM Follow-Up Recommendation:  LACE 29-58: Moderate Risk of readmission after discharge from the hospital.      Procedures during hospitalization:   None    Incidental or significant findings and recommendations (brief descriptions):  See brief synopsis above    Lab/Test results pending at Discharge:   None    Consultants:  Neurosurgery  Neurology    Discharge Medication List:     Discharge Medications        CONTINUE taking these medications        Instructions Prescription details   benzonatate 200 MG Caps  Commonly known as: Tessalon      Take 1 capsule (200 mg total) by mouth 3 (three) times daily as needed for cough.   Quantity: 20 capsule  Refills: 0     lisinopril-hydroCHLOROthiazide 10-12.5 MG Tabs  Commonly known as: Zestoretic      Take 1 tablet by mouth daily.   Quantity: 90 tablet  Refills: 3     Omega-3 1000 MG Caps      Take 1,000 mg by mouth every morning.   Refills: 0     Vitamin D 1000 units Tabs      Take 1,000 Units by mouth every morning.   Refills: 0              [place]  Follow-up appointment:   No follow-up provider specified.  Appointments for  Next 30 Days 4/10/2025 - 5/10/2025        Date Arrival Time Visit Type Length Department Provider     2025  4:00 PM  MYCHART EXAM [2964] 30 min [facility], [address] [place] [address] Sweetie Preston PA-C    Patient Instructions:     Please arrive 15 minutes prior to your scheduled appointment. Please also bring your Insurance card, Photo ID, and your medication bottles or a list of your current medication.    If your condition improves and this appointment is no longer needed, please contact your physician office to cancel.    Please verify with your primary care provider if your insurance requires a referral.        Location Instructions:     Masks are optional for all patients and visitors, unless otherwise indicated. Note: A $50 fee will be charged for missed appointments or same-day cancellations. Please provide 24 hours' notice if you need to cancel or reschedule your appointment.                      Vital signs:  Temp:  [98.1 °F (36.7 °C)] 98.1 °F (36.7 °C)  Pulse:  [71-85] 82  Resp:  [14-23] 23  BP: (144-161)/(79-92) 150/85  SpO2:  [92 %-95 %] 92 %  AO: (141-157)/(68-82) 141/72    Physical Exam:    General: No acute distress   Lungs: clear to auscultation  Cardiovascular: S1, S2  Abdomen: Soft      -----------------------------------------------------------------------------------------------  PATIENT DISCHARGE INSTRUCTIONS: See electronic chart    Dylan Bo DO    Total time spent on discharge plannin minutes     The  [employer] Cures Act makes medical notes like these available to patients in the interest of transparency. Please be advised this is a medical document. Medical documents are intended to carry relevant information, facts as evident, and the clinical opinion of the practitioner. The medical note is intended as peer to peer communication and may appear blunt or direct. It is written in medical language and may contain abbreviations or verbiage that are unfamiliar.        Electronically signed by Dylan Bo DO on 4/10/2025  1:53 PM         REVIEWER COMMENTS

## 2025-04-23 ENCOUNTER — OFFICE VISIT (OUTPATIENT)
Dept: SURGERY | Facility: CLINIC | Age: 65
End: 2025-04-23
Payer: COMMERCIAL

## 2025-04-23 VITALS
WEIGHT: 250 LBS | HEIGHT: 68 IN | OXYGEN SATURATION: 96 % | BODY MASS INDEX: 37.89 KG/M2 | DIASTOLIC BLOOD PRESSURE: 82 MMHG | HEART RATE: 110 BPM | SYSTOLIC BLOOD PRESSURE: 130 MMHG

## 2025-04-23 DIAGNOSIS — I63.031 CEREBROVASCULAR ACCIDENT (CVA) DUE TO THROMBOSIS OF RIGHT CAROTID ARTERY (HCC): Primary | ICD-10-CM

## 2025-04-23 PROCEDURE — 3008F BODY MASS INDEX DOCD: CPT | Performed by: NURSE PRACTITIONER

## 2025-04-23 PROCEDURE — 3075F SYST BP GE 130 - 139MM HG: CPT | Performed by: NURSE PRACTITIONER

## 2025-04-23 PROCEDURE — 3079F DIAST BP 80-89 MM HG: CPT | Performed by: NURSE PRACTITIONER

## 2025-04-23 PROCEDURE — 99213 OFFICE O/P EST LOW 20 MIN: CPT | Performed by: NURSE PRACTITIONER

## 2025-04-23 RX ORDER — CLOPIDOGREL BISULFATE 75 MG/1
75 TABLET ORAL DAILY
COMMUNITY
Start: 2025-04-12 | End: 2026-07-06

## 2025-04-23 RX ORDER — ATORVASTATIN CALCIUM 40 MG/1
40 TABLET, FILM COATED ORAL NIGHTLY
COMMUNITY
Start: 2025-04-11 | End: 2026-07-05

## 2025-04-23 NOTE — PATIENT INSTRUCTIONS
PT/OT/SLP eval  Continue aspirin and Plavix  Carotid US now  CTA and Perfusion in 3 months (July)  Follow up with Dr Dean - Neurology  Look into stroke support group

## 2025-04-23 NOTE — PROGRESS NOTES
[facility]  Neurological Surgery Clinic Note    Breana Banks  6/1/1960  [id number]  PCP: Jeana Bo DO    REASON FOR VISIT:  Follow-up for carotid stent    HISTORY OF PRESENT ILLNESS:  Breana Banks is a very pleasant 64 year old female with a PMH of HTN who presented to the emergency department on April 8, 2025 with right eye visual changes which subsided. She then developed left droop and left leg weakness. TNK was administered. CT negative for hemorrhage. CTA H/N shows BRENDAN stenosis and RMCA occlusion. CTP shows ischemia in the RMCA territory.  Due to her low NIH stroke scale mechanical thrombectomy was not warranted.  It was advised that the patient undergo carotid stenosis revascularization.  Due to the neurointerventional lab being under construction it was advised that the patient was transferred to [facility].  On April 10, the patient underwent a successful placement of stent in the right internal carotid artery bifurcation by Dr Hernandez Hennessy.  She was discharged from [facility] on April 11.  She presents today for follow-up.    Patient continues to have left vision difficulty and left hand weakness.  She has not started any therapy as of yet.  She is requesting orders for PT, OT and speech therapy.     PAST MEDICAL HISTORY:  Past Medical History[1]    PAST SURGICAL HISTORY:  Past Surgical History[2]    FAMILY HISTORY:  family history includes Cancer in her father; Hypertension in her paternal grandmother; Stroke in her maternal grandmother.    SOCIAL HISTORY:   reports that she has never smoked. She has never been exposed to tobacco smoke. She has never used smokeless tobacco. She reports current alcohol use of about 2.5 standard drinks of alcohol per week. She reports that she does not use drugs.    ALLERGIES:  Allergies[3]    MEDICATIONS:  Medications Ordered Prior to Encounter[4]    REVIEW OF SYSTEMS:  A 10-point system was reviewed.  Pertinent positives and negatives are  noted in HPI.      PHYSICAL EXAMINATION:  VITAL SIGNS: /82   Pulse 110   Ht 68\"   Wt 250 lb (113.4 kg)   SpO2 96%   BMI 38.01 kg/m²     A&Ox3, no acute distress  PERRL, EOMi, FS, TM, slight dysarthria  Full strength x 4, no drift  Sensation intact     NIHSS -1  mRS - 1    Imaging Review:  Cerebral angiogram with carotid stent placement 4/10/25  This patient underwent successful placement of a 8 x 40 precise stent at the right ICA bifurcation utilizing embolic protection. EPD up time was 11:44 AM, stent was deployed at 11:49 PM, EPD down time was 11:53 PM. No immediate changes in neurologic status were noted nor any complications apparent at the completion of the procedure.     ASSESSMENT:  63 yo female with recent acute ischemic stroke status post right carotid stent placement on 4/10/2025    Plan:  PT/OT/SLP eval  Continue aspirin and Plavix  Carotid US now  CTA and Perfusion in 3 months (July)  Follow up with Dr Dean - Neurology  Look into stroke support group      MARIA TERESA Butterfield, CNP  Neurological Surgery  [facility]    Care Time: 30 min including face to face time, chart review, imaging interpretation, and coordination of care         [1]   Past Medical History:   Acute upper respiratory infections of unspecified site    Asymptomatic postmenopausal status (age-related) (natural)    Body piercing    Essential hypertension, benign    Localized superficial swelling, mass, or lump    Normal delivery (HCC)    Streptococcal sore throat    Undiagnosed cardiac murmurs   [2]   Past Surgical History:  Procedure Laterality Date          Tonsillectomy      Tubal ligation  02   [3] No Known Allergies  [4]   Current Outpatient Medications on File Prior to Visit   Medication Sig Dispense Refill    clopidogrel 75 MG Oral Tab Take 1 tablet (75 mg total) by mouth daily.      atorvastatin 40 MG Oral Tab Take 1 tablet (40 mg total) by mouth nightly.      aspirin 325 MG Oral  Tab EC Take 1 tablet (325 mg total) by mouth daily.      lisinopril-hydroCHLOROthiazide 10-12.5 MG Oral Tab Take 1 tablet by mouth daily. 90 tablet 3    Omega-3 1000 MG Oral Cap Take 1,000 mg by mouth every morning.      Cholecalciferol (VITAMIN D) 1000 units Oral Tab Take 1,000 Units by mouth every morning.       No current facility-administered medications on file prior to visit.

## 2025-04-23 NOTE — PROGRESS NOTES
Provider Clarification  Additional information on the significance of the serum sodium lab values    Clinically insignificant serum sodium level     This note is part of the patient's medical record.

## 2025-04-28 ENCOUNTER — TELEPHONE (OUTPATIENT)
Dept: SURGERY | Facility: CLINIC | Age: 65
End: 2025-04-28

## 2025-04-28 DIAGNOSIS — I63.9 CEREBROVASCULAR ACCIDENT (CVA), UNSPECIFIED MECHANISM (HCC): Primary | ICD-10-CM

## 2025-04-28 NOTE — TELEPHONE ENCOUNTER
I called patient's to let her know that I ordered an MRI of the brain for her to complete as part of her stroke workup.  This is recommended after discussing with Dr. Dean.  She will follow-up with Dr. Dean in the next few weeks.

## 2025-05-02 ENCOUNTER — HOSPITAL ENCOUNTER (OUTPATIENT)
Dept: ULTRASOUND IMAGING | Age: 65
Discharge: HOME | End: 2025-05-02
Attending: NURSE PRACTITIONER
Payer: COMMERCIAL

## 2025-05-02 DIAGNOSIS — I63.031 CEREBROVASCULAR ACCIDENT (CVA) DUE TO THROMBOSIS OF RIGHT CAROTID ARTERY (HCC): ICD-10-CM

## 2025-05-02 PROCEDURE — 93880 EXTRACRANIAL BILAT STUDY: CPT | Performed by: NURSE PRACTITIONER

## 2025-05-04 ENCOUNTER — TELEPHONE (OUTPATIENT)
Dept: SURGERY | Facility: CLINIC | Age: 65
End: 2025-05-04

## 2025-05-04 DIAGNOSIS — I65.21 STENOSIS OF RIGHT CAROTID ARTERY: Primary | ICD-10-CM

## 2025-05-05 NOTE — TELEPHONE ENCOUNTER
I reviewed results of carotid US with patient. The stent is patent. Will plan to repeat in November. Continue to take DAPT. Will see PCP this week. MRI brain scheduled. Planning to get a sooner appointment with Neuro.

## 2025-05-07 ENCOUNTER — TELEPHONE (OUTPATIENT)
Dept: PHYSICAL THERAPY | Facility: HOSPITAL | Age: 65
End: 2025-05-07

## 2025-05-07 ENCOUNTER — OFFICE VISIT (OUTPATIENT)
Dept: FAMILY MEDICINE CLINIC | Facility: CLINIC | Age: 65
End: 2025-05-07
Payer: COMMERCIAL

## 2025-05-07 VITALS
HEART RATE: 93 BPM | DIASTOLIC BLOOD PRESSURE: 90 MMHG | OXYGEN SATURATION: 99 % | HEIGHT: 68 IN | WEIGHT: 253 LBS | SYSTOLIC BLOOD PRESSURE: 130 MMHG | RESPIRATION RATE: 15 BRPM | BODY MASS INDEX: 38.34 KG/M2

## 2025-05-07 DIAGNOSIS — I10 ESSENTIAL HYPERTENSION: ICD-10-CM

## 2025-05-07 DIAGNOSIS — I65.21 STENOSIS OF RIGHT CAROTID ARTERY: ICD-10-CM

## 2025-05-07 DIAGNOSIS — E78.2 MIXED HYPERLIPIDEMIA: ICD-10-CM

## 2025-05-07 DIAGNOSIS — I63.9 ACUTE CVA (CEREBROVASCULAR ACCIDENT) (HCC): Primary | ICD-10-CM

## 2025-05-07 PROBLEM — E87.1 HYPONATREMIA: Status: RESOLVED | Noted: 2025-04-08 | Resolved: 2025-05-07

## 2025-05-07 PROBLEM — E87.6 HYPOKALEMIA: Status: RESOLVED | Noted: 2025-04-08 | Resolved: 2025-05-07

## 2025-05-07 PROBLEM — I65.29 CAROTID STENOSIS: Status: ACTIVE | Noted: 2025-04-09

## 2025-05-07 PROCEDURE — 3075F SYST BP GE 130 - 139MM HG: CPT | Performed by: PHYSICIAN ASSISTANT

## 2025-05-07 PROCEDURE — 3080F DIAST BP >= 90 MM HG: CPT | Performed by: PHYSICIAN ASSISTANT

## 2025-05-07 PROCEDURE — 3008F BODY MASS INDEX DOCD: CPT | Performed by: PHYSICIAN ASSISTANT

## 2025-05-07 PROCEDURE — 99214 OFFICE O/P EST MOD 30 MIN: CPT | Performed by: PHYSICIAN ASSISTANT

## 2025-05-07 NOTE — PROGRESS NOTES
The following individual(s) verbally consented to be recorded using ambient AI listening technology and understand that they can each withdraw their consent to this listening technology at any point by asking the clinician to turn off or pause the recording:    Patient name: Breana Banks  Additional names:

## 2025-05-07 NOTE — PROGRESS NOTES
Subjective:   Breana Banks is a 64 year old female who presents for Hospital F/U (Had a stroke 4/8- therapy for OT speech and PT starting tomorrow-stent placed-edward machine down so sent to another hospital ) and Vaccinations (Would like to get 2nd dose of shingrix/Neurologist gave his blessing but she wants to make sure LR ok with it )     History/Other:   History of Present Illness  Breana Banks is a 64 year old female with a recent stroke and stent placement who presents for follow-up.    She experienced a stroke recently secondary to right ICA stenosis and right MCA occlusion. She underwent TNK treatment and carotid stenosis revascularization with stent placement.    Her current symptoms are minimal, with a lingering feeling of having narrowly escaped a life-threatening event. She has regained coordination and strength in her hand, which was initially swollen and difficult to use post-surgery. She is able to perform daily activities such as opening jars and cleaning.    Her brother had a stroke five years ago, and she has a family history of strokes, including two grandmothers who had strokes. Her brother did not receive a stent, which concerns her, but he keeps up with periodic scans.    She is currently on medications including lisinopril-hydrochlorothiazide, atorvastatin, aspirin, and clopidogrel. She monitors her blood pressure at home, which has been running low, around 112-114 systolic. She has a good support system and has retired from a stressful job, which has improved her quality of life.    She reports good sleep and appetite, and she stays hydrated. She has been receiving low sodium meals from friends. She lives with her , and she has three children living in the city nearby.    She is considering getting her second shingles shot but has decided to wait until her children visit. No residual deficits such as vision or leg issues since the stroke.     Chief Complaint Reviewed and Verified   Nursing Notes Reviewed and   Verified  Tobacco Reviewed  Allergies Reviewed  Medications Reviewed    OB Status Reviewed         Tobacco:  She has never smoked tobacco.    Current Medications[1]    Review of Systems:  Pertinent items are noted in HPI.  Review of Systems   Constitutional:  Negative for chills, fever and malaise/fatigue.   HENT:  Negative for congestion, ear pain and hearing loss.    Eyes:  Negative for blurred vision.   Respiratory:  Negative for cough, shortness of breath and wheezing.    Cardiovascular:  Negative for chest pain, palpitations and leg swelling.   Gastrointestinal:  Negative for abdominal pain, constipation, heartburn, nausea and vomiting.   Genitourinary:  Negative for dysuria, frequency and urgency.   Musculoskeletal:  Negative for falls, joint pain and myalgias.   Skin:  Negative for rash.   Neurological:  Negative for dizziness, weakness and headaches.   Psychiatric/Behavioral:  Negative for depression. The patient is not nervous/anxious and does not have insomnia.          Objective:   /90   Pulse 93   Resp 15   Ht 5' 8\" (1.727 m)   Wt 253 lb (114.8 kg)   SpO2 99%   BMI 38.47 kg/m²  Estimated body mass index is 38.47 kg/m² as calculated from the following:    Height as of this encounter: 5' 8\" (1.727 m).    Weight as of this encounter: 253 lb (114.8 kg).  Results       Physical Exam  GENERAL: Alert, cooperative, well developed, no acute distress.  HEENT: Normocephalic, normal oropharynx, moist mucous membranes.  CHEST: Clear to auscultation bilaterally, no wheezes, rhonchi, or crackles.  CARDIOVASCULAR: Normal heart rate and rhythm, S1 and S2 normal without murmurs.  ABDOMEN: Soft, non-tender, non-distended, without organomegaly, normal bowel sounds.  EXTREMITIES: No cyanosis or edema.  NEUROLOGICAL: Cranial nerves grossly intact, moves all extremities without gross motor deficit.      Assessment & Plan:   Assessment & Plan  Cerebral infarction with stent  placement  Recent cerebral infarction with stent placement. Good recovery with no residual deficits. On atorvastatin, aspirin, and clopidogrel. Clopidogrel may be discontinued in six months if follow-up is favorable per patient report. Blood pressure target is 120-140 mmHg systolic. Concerns about aspirin's gastrointestinal side effects.  - Continue atorvastatin, aspirin, and clopidogrel as prescribed.  - Monitor blood pressure at home daily, aiming for 120-140 mmHg.  - Follow up with neurology team at the end of May.  - Consider taking Pepcid daily to mitigate potential gastrointestinal side effects from aspirin.  - Schedule a nurse visit for the second shingles vaccine dose when ready.    Essential hypertension  Blood pressure well-controlled with home readings around 112 mmHg. Target range is 120-140 mmHg post-stent placement. On lisinopril-hydrochlorothiazide.  - Continue lisinopril-hydrochlorothiazide as prescribed.  - Monitor blood pressure at home daily, aiming for 120-140 mmHg.    Recording duration: 17 minutes        No follow-ups on file.        Sanjuanita Preston PA-C, 5/7/2025, 1:01 PM           [1]   Current Outpatient Medications   Medication Sig Dispense Refill    clopidogrel 75 MG Oral Tab Take 1 tablet (75 mg total) by mouth daily.      atorvastatin 40 MG Oral Tab Take 1 tablet (40 mg total) by mouth nightly.      aspirin 325 MG Oral Tab EC Take 1 tablet (325 mg total) by mouth daily.      lisinopril-hydroCHLOROthiazide 10-12.5 MG Oral Tab Take 1 tablet by mouth daily. 90 tablet 3    Omega-3 1000 MG Oral Cap Take 1,000 mg by mouth every morning.      Cholecalciferol (VITAMIN D) 1000 units Oral Tab Take 1,000 Units by mouth every morning.

## 2025-05-08 ENCOUNTER — TELEPHONE (OUTPATIENT)
Dept: PHYSICAL THERAPY | Facility: HOSPITAL | Age: 65
End: 2025-05-08

## 2025-05-08 ENCOUNTER — OFFICE VISIT (OUTPATIENT)
Dept: SPEECH THERAPY | Facility: HOSPITAL | Age: 65
End: 2025-05-08
Attending: NURSE PRACTITIONER
Payer: COMMERCIAL

## 2025-05-08 DIAGNOSIS — I63.031 CEREBROVASCULAR ACCIDENT (CVA) DUE TO THROMBOSIS OF RIGHT CAROTID ARTERY (HCC): Primary | ICD-10-CM

## 2025-05-08 PROCEDURE — 92522 EVALUATE SPEECH PRODUCTION: CPT

## 2025-05-08 NOTE — PROGRESS NOTES
ADULT SLP EVALUATION:     Diagnosis:   dysarthria Patient:  Breana Banks (64 year old, female)        Referring Provider: Perri Smith  Today's Date: 5/8/2025    Precautions:   Other (use comment) (CVA d/t thrombosis of R carotid artery) Date of Evaluation: 05/08/25  Next MD visit: No data recorded     PATIENT SUMMARY   Summary of chief complaints: speech changes s/p CVA. Patient reports minimal speech difficulty, however does state that speech becomes more imprecise when she is tired.  History of current condition: Patient presented to [facility] ED on 4/8/2025 with R eye visual changes, L facial droop and LLE weakness. TNK was administered. CT negative for hemorrhage. CTA H/N showed BRENDAN stenosis and RMCA occlusion. CTP shows ischemia in the RMCA territory.  4/10/2025 patient underwent a successful placement of stent in the right internal carotid artery bifurcation.   Pain level:  0 /10  Current limitations: some articulatory imprecision and increased speech difficulty when tired  Pt goals: returning to prior level     Hospital History: 4/8-4/9/2025 at [facility]. Transferred to [facility] and discharged home on 4/11/2025     Past medical history was reviewed with Breana.  Significant findings include: CVA  Breana Banks  has a past medical history of Acute upper respiratory infections of unspecified site, Asymptomatic postmenopausal status (age-related) (natural), Body piercing (Ears pierced), Essential hypertension, benign, Localized superficial swelling, mass, or lump, Normal delivery ([facility]) (1/1/96), Streptococcal sore throat, and Undiagnosed cardiac murmurs.  Medications Ordered Prior to this Encounter[1]    ASSESSMENT  Breana presents to speech therapy evaluation with primary c/o speech changes s/p CVA. The results of the objective tests and measures show mild dysarthria c/b slight imprecision with bilabial sounds, slightly decreased breath support, and faitgue when speaking for long periods of  time..  Functional deficits include but are not limited to some articulatory imprecision and increased speech difficulty when tired. Signs and symptoms are consistent with diagnosis of dysarthria. Pt and SLP discussed evaluation findings, pathology, POC and HEP.  Pt voiced understanding and performs HEP correctly. Skilled Speech Therapy is medically necessary to address the above impairments and reach functional goals.     OBJECTIVE:     SPEECH PRODUCTION   Severity mild   Type dysarthria   Subtype flaccid      RECEPTIVE/EXPRESSIVE LANGUAGE   Severity absent   Type Fluent   Verbal Expression WFL   Auditory Comprehension WFL   Written Expression WFL   Reading Comprehension WFL      ORAL MOTOR EXAM   Facial and Oral Structure/Appearance:  WFL   Dentition  adequate   Symmetry  symmetrical   Strength  WFL   Tone  WFL   Range of Motion  WFL   Rate of Motion  WFL   Resonance  oral focused   Respiration  adominal breathing   Articulation  slightly imprecise for bilabial sounds   Voice Quality  WFL     [place] DYSARTHRIA ASSESSMENT TOOL (N-TOM):  Panda motor speech was evaluated within the following areas: oral mechanism (see above), intelligibility, respiration, phonation, resonance, prosody, and articulation. Breana is noted to have motor speech deficits within the following areas:    [place]   Intelligibility 100% within quiet room   Respiration Adequate; some decreased breath support when reading long sentences  Respiratory Rate: 16 breaths per minute   Phonation WFL  MPT: 16  s/z ratio: 1.0   Resonance WFL   Prosody WFL   Articulation Slightly imprecise articulation with bilabial sounds  Alternating motion rates:  WFL  Sequential motion rates:  WFL    The Communicative Participation Item Bank (CPIB):  This is a patient reported outcome measure utilized to determine the impact of patient's communication disorder on daily communication tasks. The patient rates overall impact on speech conditions, health conditions,  and features of the environment.   Score: 30/30  A high score indicates better communication participation.    Today's Treatment and Response:   Pt education was provided on exam findings, treatment diagnosis, treatment plan, expectations, and prognosis.  Charges: JOSEPH odonnell 1,  Total Treatment Time: 45 min                                                  PLAN OF CARE:    Goals: (to be met in 8 visits)   Patient will independently use trained compensatory speech strategies (slow rate, over-articulation, syllable segmentation, pausing between words, breath support, etc) for production of phrases, reading, and conversation with 90% accuracy.      Progress: New goal   2.   Patient will independently produce meaningful phrases with accurate articulation in 100% of opportunities.    Progress:   New goal                Frequency / Duration: Patient will be seen 1x/week or a total of 8  visits over a 90 day period. Treatment will include: speech therapy    Education or treatment limitation: None   Rehab Potential: good    Patient/Family/Caregiver was advised of these findings, precautions, and treatment options and has agreed to actively participate in planning and for this course of care.    Thank you for your referral. Please co-sign or sign and return this letter via fax as soon as possible to [phone]. If you have any questions, please contact me at Dept: [phone]    Sincerely,  Electronically signed by therapist: ALY Gong  Physician's certification required: Yes  I certify the need for these services furnished under this plan of treatment and while under my care.    X___________________________________________________ Date____________________    Certification From: 5/8/2025  To:8/6/2025         [1] Current Outpatient Medications on File Prior to Visit: clopidogrel 75 MG Oral Tab, Take 1 tablet (75 mg total) by mouth daily., atorvastatin 40 MG Oral Tab, Take 1 tablet (40 mg total) by mouth nightly.,  aspirin 325 MG Oral Tab EC, Take 1 tablet (325 mg total) by mouth daily., lisinopril-hydroCHLOROthiazide 10-12.5 MG Oral Tab, Take 1 tablet by mouth daily., Omega-3 1000 MG Oral Cap, Take 1,000 mg by mouth every morning., Cholecalciferol (VITAMIN D) 1000 units Oral Tab, Take 1,000 Units by mouth every morning.  Current Facility-Administered Medications on File Prior to Visit: [COMPLETED] potassium chloride (Klor-Con M20) tab 40 mEq, , [COMPLETED] tenecteplase (TNKase) injection 25 mg, , [] methylPREDNISolone sodium succinate (Solu-MEDROL) injection 125 mg **AND** [] diphenhydrAMINE (Benadryl) 50 mg/mL  injection 50 mg **AND** [] famotidine (Pepcid) 20 mg/2mL injection 20 mg, , [COMPLETED] iopamidol 76% (ISOVUE-370) injection for power injector, , [COMPLETED] potassium chloride 40 mEq in 250mL sodium chloride 0.9% IVPB premix, , [COMPLETED] lidocaine-EPINEPHrine (Xylocaine-Epinephrine) 1 %-1:857127 injection,

## 2025-05-13 ENCOUNTER — OFFICE VISIT (OUTPATIENT)
Dept: SPEECH THERAPY | Facility: HOSPITAL | Age: 65
End: 2025-05-13
Attending: NURSE PRACTITIONER
Payer: COMMERCIAL

## 2025-05-13 PROCEDURE — 92507 TX SP LANG VOICE COMM INDIV: CPT

## 2025-05-13 NOTE — PROGRESS NOTES
Patient: Breana Banks (64 year old, female) Referring Provider:  Insurance:   Diagnosis: dysarthria Perri Tamayocleo  [employer]   Precautions:  Other (use comment) (CVA d/t thrombosis of R carotid artery) Next MD visit:  N/A    No data recorded Referral Information:    Date of Evaluation: Req: 0, Auth: 0, Exp:     05/08/25 POC Auth Visits:          Today's Date   5/13/2025        Treatment Day: 2    Subjective  Breana arrived early to session. She participated actively in therapeutic tasks and reports improvement in all areas within past week including speech and fine motor. She is scheduled for OT evaluation on 5/14.       Pain: 0/10     Objective  See goals below.    Goals (to be met in 8 visits)    Patient will independently use trained compensatory speech strategies (slow rate, over-articulation, syllable segmentation, pausing between words, breath support, etc) for production of phrases, reading, and conversation with 90% accuracy.   80% within reading and 75% within conversation. Increased given verbal cues.   Progress: Progressing   2.   Patient will independently produce meaningful phrases with accurate articulation in 100% of opportunities.   90% given mod verbal cues to support use of speech strategies Progress:   Progressing                Assessment  Breana presents with mild dysarthria c/b slight imprecision with bilabial sounds, slightly decreased breath support, and fatigue when speaking for long periods of time. She demonstrates receptiveness to training on compensatory speech strategies to increase precision and accuracy of articulation. Breana increased accuracy throughout structured session tasks. Continued intervention is medically necessary to support learning and progress toward goals.    Plan  Continue speech therapy targeting motor speech.    HEP  SLOB, /s/, sh minimal pairs     Charges  [id number]    Total Treatment Time: 40 min

## 2025-05-14 ENCOUNTER — OFFICE VISIT (OUTPATIENT)
Dept: OCCUPATIONAL MEDICINE | Facility: HOSPITAL | Age: 65
End: 2025-05-14
Attending: NURSE PRACTITIONER
Payer: COMMERCIAL

## 2025-05-14 DIAGNOSIS — I63.031 CEREBROVASCULAR ACCIDENT (CVA) DUE TO THROMBOSIS OF RIGHT CAROTID ARTERY (HCC): Primary | ICD-10-CM

## 2025-05-14 PROCEDURE — 97110 THERAPEUTIC EXERCISES: CPT

## 2025-05-14 PROCEDURE — 97165 OT EVAL LOW COMPLEX 30 MIN: CPT

## 2025-05-19 ENCOUNTER — TELEPHONE (OUTPATIENT)
Dept: PHYSICAL THERAPY | Facility: HOSPITAL | Age: 65
End: 2025-05-19

## 2025-05-21 ENCOUNTER — OFFICE VISIT (OUTPATIENT)
Dept: SPEECH THERAPY | Facility: HOSPITAL | Age: 65
End: 2025-05-21
Attending: NURSE PRACTITIONER
Payer: COMMERCIAL

## 2025-05-21 PROCEDURE — 92507 TX SP LANG VOICE COMM INDIV: CPT

## 2025-05-21 NOTE — PROGRESS NOTES
Patient: Breana Banks (64 year old, female) Referring Provider:  Insurance:   Diagnosis: dysarthria Perri Smith  [place]   Precautions:  Other (use comment) (CVA d/t thrombosis of R carotid artery) Next MD visit:  N/A    No data recorded Referral Information:    Date of Evaluation: Req: 0, Auth: 0, Exp:     05/08/25 POC Auth Visits:          Today's Date   5/21/2025        Treatment Day: 3    Subjective  Breana arrived early to session. She participated actively in therapeutic tasks and reports consistent completion of HEP. Discussed plan for 3 additional visits prior to d/c.       Pain: 0/10     Objective  See goals below.     Goals (to be met in 8 visits)    Patient will independently use trained compensatory speech strategies (slow rate, over-articulation, syllable segmentation, pausing between words, breath support, etc) for production of phrases, reading, and conversation with 90% accuracy.   85% in reading and conversation given min cueing.   Progress: Progressing   2.   Patient will independently produce meaningful phrases with accurate articulation in 100% of opportunities.   90% given min verbal cues to support use of speech strategies Progress:   Progressing                Assessment  Breana presents with mild dysarthria c/b slight imprecision with bilabial sounds, slightly decreased breath support, and fatigue when speaking for long periods of time. She exhibits increased independence with use of compensatory strategies. Throughout session, Breana self-corrected and was able to identify areas of need. She did require skilled cueing/support to facilitate precision with /s/. Continued intervention is medically necessary to support carryover of skills.    Plan  Continue speech therapy targeting motor speech.    HEP  SLOB, /s/, sh minimal pairs     Charges  [id number]    Total Treatment Time: 45 min   AMS

## 2025-05-22 ENCOUNTER — APPOINTMENT (OUTPATIENT)
Dept: OCCUPATIONAL MEDICINE | Facility: HOSPITAL | Age: 65
End: 2025-05-22
Attending: NURSE PRACTITIONER
Payer: COMMERCIAL

## 2025-05-22 ENCOUNTER — APPOINTMENT (OUTPATIENT)
Dept: SPEECH THERAPY | Facility: HOSPITAL | Age: 65
End: 2025-05-22
Attending: NURSE PRACTITIONER
Payer: COMMERCIAL

## 2025-05-22 ENCOUNTER — HOSPITAL ENCOUNTER (OUTPATIENT)
Dept: MRI IMAGING | Facility: HOSPITAL | Age: 65
Discharge: HOME | End: 2025-05-22
Attending: NURSE PRACTITIONER
Payer: COMMERCIAL

## 2025-05-22 DIAGNOSIS — I63.9 CEREBROVASCULAR ACCIDENT (CVA), UNSPECIFIED MECHANISM (HCC): ICD-10-CM

## 2025-05-22 PROCEDURE — 70551 MRI BRAIN STEM W/O DYE: CPT | Performed by: NURSE PRACTITIONER

## 2025-05-23 ENCOUNTER — TELEPHONE (OUTPATIENT)
Dept: SURGERY | Facility: CLINIC | Age: 65
End: 2025-05-23

## 2025-05-23 ENCOUNTER — TELEPHONE (OUTPATIENT)
Dept: NEUROLOGY | Facility: CLINIC | Age: 65
End: 2025-05-23

## 2025-05-23 NOTE — TELEPHONE ENCOUNTER
Neuro clinical, could we please review this with Dr. Dean and advise where we can place the patient for Stroke f/u?

## 2025-05-23 NOTE — TELEPHONE ENCOUNTER
Patient needs a sooner appointment with Dr Dean for stroke follow up. Stroke was April 8th so it is past the 30 day lei.

## 2025-05-23 NOTE — TELEPHONE ENCOUNTER
Enrique Dean MD to Delfina Reynoso Nurse (Selected Message)        5/23/25 12:06 PM  We can add her on Wed 28 or Thurs 29 in the afternoon slots. Let me know if that works. Thanks      Spoke with pt. Needs to arrange ride, will speak with  and call office back today    [facility] staff: when pt calls back can offer 5/28 Wednesday at 2PM or 3:20PM but please ask pt to come 10-15 minutes early as this is a squeeze in and to allow for paperwork if needed to be filled out or for traffic    If Thursday 5/29 works better, would be [place] and can offer 1:40PM with same arrival requests noted above.

## 2025-05-28 ENCOUNTER — OFFICE VISIT (OUTPATIENT)
Dept: SPEECH THERAPY | Facility: HOSPITAL | Age: 65
End: 2025-05-28
Attending: NURSE PRACTITIONER
Payer: COMMERCIAL

## 2025-05-28 PROCEDURE — 92507 TX SP LANG VOICE COMM INDIV: CPT

## 2025-05-28 NOTE — PROGRESS NOTES
Patient: Breana Banks (64 year old, female) Referring Provider:  Insurance:   Diagnosis: dysarthria Perri Smith  [place]   Precautions:  Other (use comment) (CVA d/t thrombosis of R carotid artery) Next MD visit:  N/A    No data recorded Referral Information:    Date of Evaluation: Req: 0, Auth: 0, Exp:     05/08/25 POC Auth Visits:          Today's Date   5/28/2025        Treatment Day: 4    Subjective  Breana arrived early to session. She participated actively in therapeutic tasks and reports consistent completion of HEP.       Pain: 0/10     Objective  See goals below.     Goals (to be met in 8 visits)    Patient will independently use trained compensatory speech strategies (slow rate, over-articulation, syllable segmentation, pausing between words, breath support, etc) for production of phrases, reading, and conversation with 90% accuracy.   90% in reading and conversation given min cueing.   Progress: Progressing   2.   Patient will independently produce meaningful phrases with accurate articulation in 100% of opportunities.   90% given min verbal cues to support use of speech strategies Progress:   Progressing                Assessment  Breana presents with mild dysarthria c/b slight imprecision with bilabial sounds, slightly decreased breath support, and fatigue when speaking for long periods of time. She exhibits improved self-monitoring and ability to utilize compensatory strategies proactively. Breana benefits from occasional cueing to support precision with /s/ in words, phrases, and sentences. She would benefit from 2 additional visits to support carryover of skills. Continued intervention is medically necessary.    Plan  Continue speech therapy targeting motor speech.    HEP  SLOB, /s/, sh minimal pairs     Charges  [id number]    Total Treatment Time: 45 min

## 2025-05-29 ENCOUNTER — OFFICE VISIT (OUTPATIENT)
Dept: NEUROLOGY | Facility: CLINIC | Age: 65
End: 2025-05-29
Payer: COMMERCIAL

## 2025-05-29 ENCOUNTER — APPOINTMENT (OUTPATIENT)
Dept: OCCUPATIONAL MEDICINE | Facility: HOSPITAL | Age: 65
End: 2025-05-29
Attending: NURSE PRACTITIONER
Payer: COMMERCIAL

## 2025-05-29 VITALS
OXYGEN SATURATION: 97 % | RESPIRATION RATE: 16 BRPM | SYSTOLIC BLOOD PRESSURE: 122 MMHG | BODY MASS INDEX: 39 KG/M2 | DIASTOLIC BLOOD PRESSURE: 62 MMHG | WEIGHT: 254.81 LBS | HEART RATE: 75 BPM

## 2025-05-29 DIAGNOSIS — Z86.73 CHRONIC ISCHEMIC RIGHT MCA STROKE: Primary | ICD-10-CM

## 2025-05-29 DIAGNOSIS — Q21.12 PATENT FORAMEN OVALE (HCC): ICD-10-CM

## 2025-05-29 PROCEDURE — 3078F DIAST BP <80 MM HG: CPT | Performed by: OTHER

## 2025-05-29 PROCEDURE — 3074F SYST BP LT 130 MM HG: CPT | Performed by: OTHER

## 2025-05-29 PROCEDURE — 99215 OFFICE O/P EST HI 40 MIN: CPT | Performed by: OTHER

## 2025-05-29 NOTE — PATIENT INSTRUCTIONS
After your visit at the [place] office  today, please direct any follow up questions or medication needs to the staff in our [place] office so that your concerns may be promptly addressed.  We are available through [employer] or at the numbers below:    The phone number is:   [phone], option 1    The fax number is:  [phone]    Your pharmacy should also send any requests electronically to the [place] office.       Refill policies:    Allow 2-3 business days for refills; controlled substances may take longer.  Contact your pharmacy at least 5 days prior to running out of medication and have them send an electronic request or submit request through the “request refill” option in your [employer] account.  Refills are not addressed on weekends; covering physicians do not authorize routine medications on weekends.  No narcotics or controlled substances are refilled after noon on Fridays or by on call physicians.  By law, narcotics must be electronically prescribed.  A 30 day supply with no refills is the maximum allowed.  If your prescription is due for a refill, you may be due for a follow up appointment.  To best provide you care, patients receiving routine medications need to be seen at least once a year.  Patients receiving narcotic/controlled substance medications need to be seen at least once every 3 months.  In the event that your preferred pharmacy does not have the requested medication in stock (e.g. Backordered), it is your responsibility to find another pharmacy that has the requested medication available.  We will gladly send a new prescription to that pharmacy at your request.    Scheduling Tests:    If your physician has ordered radiology tests such as MRI or CT scans, please contact Central Scheduling at [phone] right away to schedule the test.  Once scheduled, the [facility] Centralized Referral Team will work with your insurance carrier to obtain pre-certification or prior authorization.   Depending on your insurance carrier, approval may take 3-10 days.  It is highly recommended patients assure they have received an authorization before having a test performed.  If test is done without insurance authorization, patient may be responsible for the entire amount billed.      Precertification and Prior Authorizations:  If your physician has recommended that you have a procedure or additional testing performed the [facility] Centralized Referral Team will contact your insurance carrier to obtain pre-certification or prior authorization.    You are strongly encouraged to contact your insurance carrier to verify that your procedure/test has been approved and is a COVERED benefit.  Although the [facility] Centralized Referral Team does its due diligence, the insurance carrier gives the disclaimer that \"Although the procedure is authorized, this does not guarantee payment.\"    Ultimately the patient is responsible for payment.   Thank you for your understanding in this matter.  Paperwork Completion:  If you require FMLA or disability paperwork for your recovery, please make sure to either drop it off or have it faxed to our office at [phone]. Be sure the form has your name and date of birth on it.  The form will be faxed to our Forms Department and they will complete it for you.  There is a 25$ fee for all forms that need to be filled out.  Please be aware there is a 10-14 day turnaround time.  You will need to sign a release of information (POLLO) form if your paperwork does not come with one.  You may call the Forms Department with any questions at [phone].  Their fax number is [phone].

## 2025-05-30 NOTE — PROGRESS NOTES
HPI:    Patient ID: Breana Banks is a 64 year old female.    HPI    Breana Banks is a very pleasant 64 year old female who presents for right MCA stroke follow up. She has history of HTN who presented to hospital on April 8, 2025 with left sided weakness. CT head was negative. Received TNK. CTA H/N shows BRENDAN stenosis and RMCA occlusion. CTP shows ischemia in the RMCA territory.  Due to her low NIH stroke scale mechanical thrombectomy was not warranted and carotid stenosis revascularization was recommended.  Due to the neurointerventional lab being under construction patient was transferred to [facility].  On April 10, the patient underwent a successful placement of stent in the right internal carotid artery by Dr Hernandez Hennessy.  She was discharged from [facility] on April 11 and recovered well. Reports left sided weakness has improved completely. She has mild dysarthria and she receiving physical therapy.    Post stent US carotid doppler 5/2/2025 shows patent stent  MRI brain     HISTORY:  Past Medical History[1]   Past Surgical History[2]   Family History[3]   Short Social Hx on File[4]     Review of Systems       Current Medications[5]  Allergies:Allergies[6]  PHYSICAL EXAM:   Physical Exam  Blood pressure 122/62, pulse 75, resp. rate 16, weight 254 lb 12.8 oz (115.6 kg), SpO2 97%.  General Appearance: Well nourished, well developed, no apparent distress.   HEENT: Normocephalic and atraumatic.   Cardiovascular: Normal rate, regular rhythm and normal heart sounds.    Pulmonary/Chest: Effort normal and breath sounds normal.   Abdominal: Soft. Bowel sounds are normal.   Skin: dry, clean and intact  Ext: peripheral pulses present  Psych: normal mood and affect    Neurological:  Patient is awake, alert and oriented to person, place and time   Normal memory, attention/concentration, speech and language.    Cranial Nerves:   II: Visual acuity: normal  II: Visual fields: normal  III: Pupils: equal, round,  reactive to light  III,IV,VI: Extra Ocular Movements: intact  V: Facial sensation: intact  VII: Facial strength: intact  VIII: Hearing: intact  IX: Palate: intact  XI: Shoulder shrug: intact  XII: Tongue movement: normal    Motor: Normal tone. Strength is  5 out of 5 in all extremities bilaterally.  DTR: present 2+ throughout    Sensory: Sensory examination is normal to light touch and pinprick     Coordination: Finger-to-nose normal bilaterally without evidence of dysmetria.    Gait: normal casual gait        NIHSS -0     1a. Level of consciousness: 0  1b. LOC Questions: 0  1c. LOC Commands: 0  2. Best Gaze: 0  3. Visual: 0  4. Facial Palsy:0  5a. Left Arm:0  5b. Right Arm:0  6a. Left Le  6b. Right Le  7. Limb Ataxia: 0  8. Sensory: 0  9. Best Language:0  10. Dysarthria:0  11. Extinction and Inattention (formerly Neglect):0    MRS- 1      TESTS/IMAGING:         MRI brain: 2025          Impression   CONCLUSION:       1. No acute intracranial abnormality identified.     2. Redemonstration of small subacute to chronic infarct in the right MCA territory as above.     3. Minimal chronic microvascular ischemic changes in the cerebral white matter.         CTA head and neck: 2025  mpression   CONCLUSION:    1. Large vessel occlusion involving 2 of 3 branches at the right MCA trifurcation.  There is associated severe perfusion defect which involves the majority of the right MCA territory.  There is no corresponding cerebral blood flow defect which I suspect  is artifact based on appearance on the noncontrast CT although there is certainly a large area of ischemic penumbra.  2. Severe stenosis or near occlusion cervical right internal carotid artery at the origin of the vessel.  3. There is also a perfusion defect in the left frontal lobe which would be peripheral anterior MCA territory.  There may be occlusion of smaller vessel which would be a 4th order branch of left MCA anteriorly as there is  hypoperfusion of this segment  noted with absence of small vessels.  The exact occluded branch cannot be identified on this study.  4. There is hypodensity again noted right insular cortex and around the right sylvian fissure which is probably infarct related to the MCA occlusion on the right.        IR ANGIO NEURO: [facility] 4/10/25  FINAL ANGIOGRAPHIC IMPRESSION:   This patient underwent successful placement of a 8 x 40 precise stent at the right ICA bifurcation utilizing embolic protection. EPD up time was 11:44 AM, stent was deployed at 11:49 PM, EPD down time was 11:53 PM. No immediate changes in neurologic status were noted nor any complications apparent at the completion of the procedure.           Findings:   Left ventricle:  The cavity size was normal. Wall thickness was normal.   Systolic function was normal. The estimated ejection fraction was 55-60%, by   visual assessment. No diagnostic evidence for diffuse regional wall motion   abnormalities. No diagnostic evidence for regional wall motion   abnormalities. Left ventricular diastolic function parameters were normal.   Ventricular septum:   Thickness was normal.   Left atrium:  The left atrial volume was normal.   Right ventricle:  The cavity size was normal. Systolic function was normal.   Right atrium:  The atrium was normal in size.   Mitral valve:  The valve was structurally normal. Leaflet separation was   normal.  Doppler:  Transvalvular velocity was within the normal range. There   was no evidence for stenosis. There was mild regurgitation.   Aortic valve:  The valve was structurally normal. The valve was trileaflet.   Cusp separation was normal.  Doppler:  Transvalvular velocity was within the   normal range. There was no evidence for stenosis. There was no significant   regurgitation.   Tricuspid valve:  The valve is structurally normal. Leaflet separation was   normal.  Doppler:  Transvalvular velocity was within the normal range.  There   was no evidence for stenosis. There was trivial regurgitation.   Pulmonic valve:   The valve is structurally normal. Cusp separation was   normal.  Doppler:  Transvalvular velocity was within the normal range. There   was no evidence for stenosis. There was trivial regurgitation.   Pericardium:   There was no pericardial effusion.   Aorta:   Aortic root: The aortic root was normal-sized.   Ascending aorta: The ascending aorta was normal.   Pulmonary arteries:   The main pulmonary artery was normal-sized.  Systolic pressure could not be   accurately estimated.   Systemic veins:   Inferior vena cava: The IVC was normally collapsible and normal-sized.   Atrial septum:  There was a probable patent foramen ovale. Positive bubble   study, though image quality   ASSESSMENT/PLAN:     Encounter Diagnoses   Name Primary?    Chronic ischemic right MCA stroke Yes    Patent foramen ovale (HCC)          Right MCA stroke due to right M1 MCA occlusion and right ICA severe stenosis/occlusion s/p TNK and right carotid stent placement  Etiology: large vessel atherosclerosis    US carotid doppler post stent placement shows patent right carotid ICA stent    Continue Aspirin 325 mg + Clopidogrel 75 mg daily  Continue Atorvastatin 40 mg daily    Follow with [facility] for carotid stent management. Repeat CTA in 3 months as recommended    Possible patent foramen ovale seen on ECHO with bubble study done here at [facility] but repeat ECHO with bubbles at [facility] reports no evidence of right to left shunt on agitated saline study    Explain to the patient that if there is any PFO it was an incidental finding and unrelated to patient's present stroke.  We recommend Cardiology for evaluation and to review the ECHO images done at both places to determine if there is PFO or not    She indicates understanding      Thank you for allowing us to participate in your patient's care.     Enrique Dean MD   [facility]  [place]      This note was prepared using Dragon Medical voice recognition dictation software. As a result errors may occur. When identified these errors have been corrected. While every attempt is made to correct errors during dictation discrepancies may still exist         No orders of the defined types were placed in this encounter.      Thank you for allowing us to participate in your patient's care.    Meds This Visit:  Requested Prescriptions      No prescriptions requested or ordered in this encounter       Imaging & Referrals:  CARDIO - INTERNAL     ID#[id number]         [1]   Past Medical History:   Acute upper respiratory infections of unspecified site    Asymptomatic postmenopausal status (age-related) (natural)    Body piercing    CVA (cerebral vascular accident) (HCC)    Essential hypertension, benign    Localized superficial swelling, mass, or lump    Normal delivery (HCC)    Streptococcal sore throat    Undiagnosed cardiac murmurs   [2]   Past Surgical History:  Procedure Laterality Date          Ir stent  2025    Tonsillectomy      Tubal ligation  2002   [3]   Family History  Problem Relation Age of Onset    Cancer Father     Hypertension Paternal Grandmother     Stroke Maternal Grandmother    [4]   Social History  Socioeconomic History    Marital status:    Tobacco Use    Smoking status: Never     Passive exposure: Never    Smokeless tobacco: Never   Vaping Use    Vaping status: Never Used   Substance and Sexual Activity    Alcohol use: Yes     Alcohol/week: 2.5 standard drinks of alcohol     Types: 3 Standard drinks or equivalent per week     Comment: social    Drug use: Never   Other Topics Concern    Caffeine Concern Yes     Comment: 1 cup coffee/day    Exercise Yes     Comment: daily     Social Drivers of Health     Food Insecurity: No Food Insecurity (4/10/2025)    Received from [facility] - Food Insecurity     Worried About Running  Out of Food in the Last Year: No     Ran Out of Food in the Last Year: No   Transportation Needs: No Transportation Needs (4/10/2025)    Received from [facility] - Transportation     Lack of Transportation: No   Housing Stability: Not At Risk (4/10/2025)    Received from [facility] - Housing/Utilities     Has Housing: Yes     Worried About Losing Housing: No     Unable to Get Utilities: No   [5]   Current Outpatient Medications   Medication Sig Dispense Refill    clopidogrel 75 MG Oral Tab Take 1 tablet (75 mg total) by mouth daily.      atorvastatin 40 MG Oral Tab Take 1 tablet (40 mg total) by mouth nightly.      aspirin 325 MG Oral Tab EC Take 1 tablet (325 mg total) by mouth daily.      lisinopril-hydroCHLOROthiazide 10-12.5 MG Oral Tab Take 1 tablet by mouth daily. 90 tablet 3    Omega-3 1000 MG Oral Cap Take 1,000 mg by mouth every morning.      Cholecalciferol (VITAMIN D) 1000 units Oral Tab Take 1,000 Units by mouth every morning.     [6] No Known Allergies

## 2025-06-02 ENCOUNTER — TELEPHONE (OUTPATIENT)
Dept: PHYSICAL THERAPY | Facility: HOSPITAL | Age: 65
End: 2025-06-02

## 2025-06-02 ENCOUNTER — OFFICE VISIT (OUTPATIENT)
Dept: OCCUPATIONAL MEDICINE | Facility: HOSPITAL | Age: 65
End: 2025-06-02
Attending: NURSE PRACTITIONER
Payer: COMMERCIAL

## 2025-06-02 PROCEDURE — 97110 THERAPEUTIC EXERCISES: CPT

## 2025-06-02 NOTE — PROGRESS NOTES
Discharge Summary  Pt has attended 2 visits in Occupational Therapy.      Patient: Breana Banks (65 year old, female) Referring Provider:  Insurance:   Diagnosis: Cerebrovascular accident (CVA) due to thrombosis of right carotid artery (HCC) (I63.031) Perri Smith  [employer] [place] PPO   Date of Surgery: 4/10/2025 Next MD visit:  N/A   Precautions:  None 6/20/2025 Referral Information:   Date of Injury: No data recorded Date of Evaluation: Req: 0, Auth: 0, Exp:     05/14/25 POC Auth Visits:          Today's Date   6/2/2025    Subjective  Patient presents to OT reporting confidence in her ability to maintain progress through continued engagement in home exercises and functional daily tasks.       Pain: 0/10     Objective  Patient demonstrates improved upper extremity strength and coordination during today’s session, with no reported pain or difficulty during activities.    Discharge   ROM and Strength:  (* denotes performed with pain)  Hand Strength (lbs) R L      65.4 lbs  61.23 lbs      2 pt Pinch 11 10     3 pt Pinch 15 14     Lateral pinch 15 15     Neurological:  Cognition:   Overall Cognitive Status: within functional limits     Vision:   Current Vision: wears glasses all the time  No visual deficits      Perception:   Overall Perception Status: within functional limits       Sensory: WNL    Coordination:   9 Hole Peg Test: L 23.45 seconds  R: 17.55 seconds     Evaluation   ROM and Strength:  (* denotes performed with pain)  Hand Strength (lbs) R L      58.6 59.6     2 pt Pinch 8 9     3 pt Pinch 13 11     Lateral pinch 15 15       Neurological:  Cognition:   Overall Cognitive Status: within functional limits      ,   Vision:   Current Vision: wears glasses all the time    No visual deficits      ,   Perception:   Overall Perception Status: within functional limits       Sensory: WNL    Coordination:     9 Hole Peg Test: L 25.63 seconds  R: 25.63 seconds            Assessment  Improvements in strength and  coordination, absence of concerning symptoms, and the patient’s self-reported confidence indicate readiness for discharge from skilled OT services. Progress made supports a safe and independent continuation of care through a home program.    Goals (to be met in  )   Pt will improve in 3 lbs in  strength to increase upper extremity strength, range of motion, and coordination to support daily tasks.  (MET)  Pt will increase endurance and activity tolerance to 35 minutes with no breaks to support participation in ADL/IADL.  (MET)  Pt will be independent and compliant with comprehensive HEP to maintain progress achieved in OT.  (MET)      Treatment Last 4 Visits        5/14/2025 6/2/2025   OT Treatment   Treatment Day  2   Therapeutic Exercise HEP review  AROM/PROM wrist and digits  Reassessment    Therapeutic Exercise Min 10 35   Eval Min 35    Total Timed Procedures 10 35   Total Service Procedures 45 35   Total Time 45 35         HEP  Red theraputty: series of exercises completed 2-3x/day        Charges     TEx2    Post QuickDASH Outcome Score  Post Score: 0 % (6/2/2025  4:16 PM)    0 % improvement    Plan  Discontinue skilled OT services. Patient was provided with an updated home exercise program and educated on activity modifications and symptom monitoring. Recommend follow-up only as needed or if new issues arise.    Patient/Family/Caregiver was advised of these findings, precautions, and treatment options and has agreed to actively participate in planning and for this course of care.    Thank you for your referral. If you have any questions, please contact me at Dept: [phone].    Sincerely,  Electronically signed by therapist: Zoey Dotson OT     Physician's certification required:  Yes  Please co-sign or sign and return this letter via fax as soon as possible to [phone].   I certify the need for these services furnished under this plan of treatment and while under my  care.    X___________________________________________________ Date____________________    Certification From: 6/2/2025  To:8/31/2025

## 2025-06-03 ENCOUNTER — OFFICE VISIT (OUTPATIENT)
Dept: SPEECH THERAPY | Facility: HOSPITAL | Age: 65
End: 2025-06-03
Attending: NURSE PRACTITIONER
Payer: COMMERCIAL

## 2025-06-03 PROCEDURE — 92507 TX SP LANG VOICE COMM INDIV: CPT

## 2025-06-03 NOTE — PROGRESS NOTES
Patient: Breana Banks (65 year old, female) Referring Provider:  Insurance:   Diagnosis: dysarthria Perri Smith  [employer] [place] PPO   Precautions:  Other (use comment) (CVA d/t thrombosis of R carotid artery) Next MD visit:  N/A    No data recorded Referral Information:    Date of Evaluation: Req: 0, Auth: 0, Exp:     05/08/25 POC Auth Visits:          Today's Date   6/3/2025        Treatment Day: 5    Subjective  Breana arrived on time to session. She participated actively in therapeutic tasks and reports consistent completion of HEP.       Pain: 0/10     Objective  See goals below.        Goals (to be met in 8 visits)    Patient will independently use trained compensatory speech strategies (slow rate, over-articulation, syllable segmentation, pausing between words, breath support, etc) for production of phrases, reading, and conversation with 90% accuracy.   95% independently   Progress: Goal met; gauge carryover   2.   Patient will independently produce meaningful phrases with accurate articulation in 100% of opportunities.   100% independently Progress:   Goal met; gauge carryover                Assessment  Breana presents with mild dysarthria c/b slight imprecision with bilabial sounds, slightly decreased breath support, and fatigue when speaking for long periods of time. She exhibits improved self-monitoring and ability to utilize compensatory strategies proactively. Breana continues to demonstrate independent use of compensatory strategies to support precision and accuracy. She would benefit from 1 final d/c session to ensure carryover of skills. Continued intervention is medically necessary.    Plan  Continue speech therapy targeting motor speech.    HEP  SLOB, /s/, sh minimal pairs     Charges  [id number]    Total Treatment Time: 45 min

## 2025-06-04 ENCOUNTER — OFFICE VISIT (OUTPATIENT)
Dept: PHYSICAL THERAPY | Facility: HOSPITAL | Age: 65
End: 2025-06-04
Attending: NURSE PRACTITIONER
Payer: COMMERCIAL

## 2025-06-04 DIAGNOSIS — I63.031 CEREBROVASCULAR ACCIDENT (CVA) DUE TO THROMBOSIS OF RIGHT CAROTID ARTERY (HCC): Primary | ICD-10-CM

## 2025-06-04 PROCEDURE — 97110 THERAPEUTIC EXERCISES: CPT

## 2025-06-04 PROCEDURE — 97162 PT EVAL MOD COMPLEX 30 MIN: CPT

## 2025-06-05 ENCOUNTER — APPOINTMENT (OUTPATIENT)
Dept: OCCUPATIONAL MEDICINE | Facility: HOSPITAL | Age: 65
End: 2025-06-05
Attending: NURSE PRACTITIONER
Payer: COMMERCIAL

## 2025-06-05 ENCOUNTER — APPOINTMENT (OUTPATIENT)
Dept: SPEECH THERAPY | Facility: HOSPITAL | Age: 65
End: 2025-06-05
Attending: NURSE PRACTITIONER
Payer: COMMERCIAL

## 2025-06-05 NOTE — PROGRESS NOTES
NEUROLOGICAL EVALUATION:     Diagnosis:   Cerebrovascular accident (CVA) due to thrombosis of right carotid artery (HCC) (I63.031) Patient:  Breana Banks (65 year old, female)        Referring Provider: Perri Smith  Today's Date   2025    Precautions:  None Date of Evaluation: 25       PATIENT SUMMARY   Summary of chief complaints: Post-CVA  History of current condition: Pt had CVA on . Feels like she does not have any remaining deficits. Attended ~ 2 sessions of OT then was discharged due to lack of deficits.   Pain level: current 0 /10, at best 0 /10, at worst 0 /10  Description of symptoms: 0   Occupation: Retired.   Prior level of function: working and independent.  Current limitations: None  Pt goals: Full return to PLOF.   History of falls: No     Home Environment: Lives w/ husbands. Has stairs, able to do them w/o issues. Walks up w/ reciprocal pattern and no railing.   ADLs: independent     Past medical history was reviewed with Breana. Imaging/Tests: Brain MRI:  No acute intracranial abnormality identified.   2. Redemonstration of small subacute to chronic infarct in the right MCA territory as above.   3. Minimal chronic microvascular ischemic changes in the cerebral white matter.   Breana  has a past medical history of Acute upper respiratory infections of unspecified site, Asymptomatic postmenopausal status (age-related) (natural), Body piercing (Ears pierced), CVA (cerebral vascular accident) ([facility]) (2025), Essential hypertension, benign, Localized superficial swelling, mass, or lump, Normal delivery ([facility]) (1996), Streptococcal sore throat, and Undiagnosed cardiac murmurs.  She  has a past surgical history that includes  (); tonsillectomy; tubal ligation (2002); and ir stent (2025).    ASSESSMENT  Breana presents to physical therapy evaluation with primary c/o Post-CVA. The results of the objective tests and measures show limited SL balance, mild  weakness in hips. Functional deficits include but are not limited to None. Signs and symptoms are consistent with diagnosis of Cerebrovascular accident (CVA) due to thrombosis of right carotid artery (HCC) (I63.031). Pt and PT discussed evaluation findings, pathology, POC and HEP. Agreed that a few follow-up visits would be beneficial to monitor pt's progress. Pt voiced understanding and performs HEP correctly without reported pain. Skilled Physical Therapy is medically necessary to address the above impairments and reach functional goals.    OBJECTIVE:    Musculoskeletal:      ROM and Strength:  (* denotes performed with pain)  Hip   MMT (-/5)    R L     Flex (L2) 4+ 4+     Ext          Abd         ER 4+ 4+     IR        ,   Knee   MMT (-/5)    R L     Flex 5 5     Ext (L3) 5 5     ,   Ankle/Foot   ROM    R L     PF         DF (L4) 5 5     Inversion         Eversion         Grt Toe Ext (L5)             Neurological:  Coordination:  Finger to Nose: WNL   Pronation/Supination: WNL       Postural Control:  Romberg EO: level surface 30 sec; compliant surface 30 sec     Romberg EC: level surface 30 sec; compliant surface 30 sec  Tandem Stance: R back: 30 sec; L back: 30 sec; Fall Risk:    SLS: R: 10 sec; L: 25 sec; Fall Risk: No    Gait: pt ambulates on level ground with trendelenburg/waddle       Today's Treatment and Response:   Pt education was provided on exam findings, treatment diagnosis, treatment plan, expectations, and prognosis.  Today's Treatment       6/4/2025   Neuro Treatment   Therapeutic Exercise HEP review    Therapeutic Exercise Minutes 10   Total Time Of Timed Procedures 10   Total Time Of Service-Based Procedures 0   Total Treatment Time 10   HEP Access Code: [email or web address]  URL: [email or web address]  Date: 06/05/2025  Prepared by: Eloina Grimes    Exercises  - Lateral Step Up  - 1 x daily - 7 x weekly - 2 sets - 10 reps  - Single Leg Stance  - 1 x daily - 7 x weekly - 3 sets - 20  hold  - Hip Abduction with Resistance Loop  - 1 x daily - 7 x weekly - 2 sets - 10 reps  - Marching with Resistance  - 1 x daily - 7 x weekly - 2 sets - 10 reps        Patient was instructed in and issued a HEP for: Access Code: [email or web address]  URL: [email or web address]  Date: 06/05/2025  Prepared by: Eloina Grimes    Exercises  - Lateral Step Up  - 1 x daily - 7 x weekly - 2 sets - 10 reps  - Single Leg Stance  - 1 x daily - 7 x weekly - 3 sets - 20 hold  - Hip Abduction with Resistance Loop  - 1 x daily - 7 x weekly - 2 sets - 10 reps  - Marching with Resistance  - 1 x daily - 7 x weekly - 2 sets - 10 reps    Charges:  PT EVAL: Moderate Complexity, x1 ther ex  In agreement with evaluation findings and clinical rationale, this evaluation involved MODERATE COMPLEXITY decision making due to 1-2 personal factors/comorbidities, 3 or more body structures involved/activity limitations, and evolving symptoms as documented in the evaluation.                                                        PLAN OF CARE:    Goals: (to be met in 5 visits)    Not Met Progress Toward Partially Met Met   Pt will demonstrate improved SLS to >30 seconds YONATAN to promote safety and decrease risk of falls on uneven surfaces such as grass and gravel. [] [] [] []   Pt will perform FGA with score of 30/30  to demonstrate ability to ambulate safely in crowded and busy environments such as the grocery store. [] [] [] []   Pt will improve  hip strength to at least 5/5 to allow her to walk long distances w/o fatigue or difficulty.  [] [] [] []   Pt will be independent and compliant with comprehensive HEP to maintain progress achieved in PT. [] [] [] []         Frequency / Duration: Patient will be seen 1-2x/week or a total of 5 visits over a 90 day period. Treatment will include: Gait training; Manual Therapy; Mechanical Traction; Neuromuscular Re-education; Self-Care Home Management; Therapeutic Activities; Therapeutic Exercise; Home  Exercise Program instruction; Electrical stimulation (unattended); Electrical stimulation (attended); Ultrasound; Patient/Family Education    Education or treatment limitation: None   Rehab Potential: good     QuickDASH Outcome Score  Score: (Patient-Rptd) 0 % (6/4/2025 12:29 PM)      Patient/Family/Caregiver was advised of these findings, precautions, and treatment options and has agreed to actively participate in planning and for this course of care.    Thank you for your referral. Please co-sign or sign and return this letter via fax as soon as possible to [phone]. If you have any questions, please contact me at Dept: [phone]    Sincerely,  Electronically signed by therapist: Eloina Grimes PT  Physician's certification required: Yes  I certify the need for these services furnished under this plan of treatment and while under my care.    X___________________________________________________ Date____________________    Certification From: 6/5/2025  To:9/3/2025

## 2025-06-09 ENCOUNTER — APPOINTMENT (OUTPATIENT)
Dept: OCCUPATIONAL MEDICINE | Facility: HOSPITAL | Age: 65
End: 2025-06-09
Attending: NURSE PRACTITIONER
Payer: COMMERCIAL

## 2025-06-10 ENCOUNTER — OFFICE VISIT (OUTPATIENT)
Dept: SPEECH THERAPY | Facility: HOSPITAL | Age: 65
End: 2025-06-10
Attending: NURSE PRACTITIONER
Payer: COMMERCIAL

## 2025-06-10 PROCEDURE — 92507 TX SP LANG VOICE COMM INDIV: CPT

## 2025-06-10 NOTE — PROGRESS NOTES
Patient: Breana Banks (65 year old, female) Referring Provider:  Insurance:   Diagnosis: dysarthria Perri Kancleo  [place]   Precautions:  Other (use comment) (CVA d/t thrombosis of R carotid artery) Next MD visit:  N/A    No data recorded Referral Information:    Date of Evaluation: Req: 0, Auth: 0, Exp:     05/08/25 POC Auth Visits:        Discharge Summary  Pt has attended 6 visits in Speech Therapy.   Today's Date   6/10/2025        Treatment Day: 6    Dear Perri Smith,  This letter is to inform you of Breana Banks's progress in speech-language therapy.    Since her initial evaluation, Breana has attended 6 sessions. Therapy sessions have targeted motor speech. A home exercise program (HEP) addressing use of compensatory speech strategies has been provided and completed consistently. During this treatment period, Breana has demonstrated improved use of compensatory strategies. As she has demonstrated significant progress and is independent with practice, it is recommended that she be discharged from speech therapy at this time.    Subjective  Breana arrived on time to session. She participated actively in therapeutic tasks and reports consistent completion of HEP.       Pain: 0/10     Objective  See goals below.      Goals (to be met in 8 visits)    Patient will independently use trained compensatory speech strategies (slow rate, over-articulation, syllable segmentation, pausing between words, breath support, etc) for production of phrases, reading, and conversation with 90% accuracy.   95% independently   Progress: Goal met.   2.   Patient will independently produce meaningful phrases with accurate articulation in 100% of opportunities.   100% independently Progress:   Goal met.        Assessment  Breana presented to speech therapy with mild dysarthria c/b slight imprecision with bilabial sounds, slightly decreased breath support, and fatigue when speaking for long periods of time. Breana has demonstrated  significant progress toward goals since SOC. She is independent with use of compensatory strategies and 100% intelligible. As such, it is recommended that she be discharged from speech therapy at this time.    Plan  Discontinue as patient has met all goals and is independent with use of strategies.    HEP  SLOB strategies, /s/, sh minimal pairs, /s/ words, phrases, sentences, paragraphs     Charges  76121    Total Treatment Time: 45 min    Patient/Family/Caregiver was advised of these findings, precautions, and treatment options and has agreed to actively participate in planning and for this course of care.    Thank you for your referral. If you have any questions, please contact me at Dept: [phone].    Sincerely,  Electronically signed by therapist: ALY Gong

## 2025-06-11 ENCOUNTER — APPOINTMENT (OUTPATIENT)
Dept: PHYSICAL THERAPY | Facility: HOSPITAL | Age: 65
End: 2025-06-11
Attending: NURSE PRACTITIONER
Payer: COMMERCIAL

## 2025-06-12 ENCOUNTER — APPOINTMENT (OUTPATIENT)
Dept: OCCUPATIONAL MEDICINE | Facility: HOSPITAL | Age: 65
End: 2025-06-12
Attending: NURSE PRACTITIONER
Payer: COMMERCIAL

## 2025-06-12 ENCOUNTER — APPOINTMENT (OUTPATIENT)
Dept: SPEECH THERAPY | Facility: HOSPITAL | Age: 65
End: 2025-06-12
Attending: NURSE PRACTITIONER
Payer: COMMERCIAL

## 2025-06-13 ENCOUNTER — APPOINTMENT (OUTPATIENT)
Dept: PHYSICAL THERAPY | Facility: HOSPITAL | Age: 65
End: 2025-06-13
Attending: NURSE PRACTITIONER
Payer: COMMERCIAL

## 2025-06-16 ENCOUNTER — OFFICE VISIT (OUTPATIENT)
Dept: PHYSICAL THERAPY | Facility: HOSPITAL | Age: 65
End: 2025-06-16
Attending: NURSE PRACTITIONER
Payer: COMMERCIAL

## 2025-06-16 ENCOUNTER — APPOINTMENT (OUTPATIENT)
Dept: OCCUPATIONAL MEDICINE | Facility: HOSPITAL | Age: 65
End: 2025-06-16
Attending: NURSE PRACTITIONER
Payer: COMMERCIAL

## 2025-06-16 PROCEDURE — 97110 THERAPEUTIC EXERCISES: CPT

## 2025-06-16 NOTE — PROGRESS NOTES
Patient: Breana Banks (65 year old, female) Referring Provider:  Insurance:   Diagnosis: Cerebrovascular accident (CVA) due to thrombosis of right carotid artery (HCC) (I63.031) Perri Smith  [place]   Date of Surgery: No data recorded Next MD visit:  N/A   Precautions:  None No data recorded Referral Information:    Date of Evaluation: Req: 0, Auth: 0, Exp:     06/04/25 POC Auth Visits:  0        Discharge Summary  Pt has attended 2 visits in Physical Therapy.      Today's Date   6/16/2025    Subjective  Pt states she is doing well. does not notice any deficits.       Pain: 0/10     Objective          Hip       6/5/2025   Hip ROM/MMT   Rt Hip Flexion MMT (L2) 5   Lt Hip Flexion MMT (L2) 5   Rt Hip ER MMT 5   Lt Hip ER MMT 5    Rt Hip ABD MMT                         4+    Lt hip ABD MMT             4+         Knee       6/5/2025   Knee ROM/MMT   Rt Knee Flexion MMT 5   Lt Knee Flexion MMT 5   Rt Knee Extension MMT (L3) 5   Lt Knee Extension MMT (L3) 5   , Ankle/Foot       6/5/2025   Ankle/Foot ROM/MMT   Rt Foot/Ank Df 5   Lt Foot/Ank Df 5     Postural Control:  Romberg EO: level surface 30 sec; compliant surface 30 sec     Romberg EC: level surface 30 sec; compliant surface 30 sec  Tandem Stance: R back: 30 sec; L back: 30 sec; Fall Risk:    SLS: R: 12 sec; L: 30 sec; Fall Risk: No      Assessment  Pt presents for session following IE. HEP has been going well and pt continues to report no noticable deficits or difficulty w/ any ADLs. Pt's only objective findings are mild weakness in hip abds and limited SL balance on R. HEP progressed to address these. Pt will be discharged from PT with HEP. Pt in agreement w/ discharge plan.    Goals (to be met in 5 visits)      Not Met Progress Toward Partially Met Met   Pt will demonstrate improved SLS to >30 seconds YONATAN to promote safety and decrease risk of falls on uneven surfaces such as grass and gravel. [] [x] [] []   Pt will perform FGA with score of 30/30  to  demonstrate ability to ambulate safely in crowded and busy environments such as the grocery store. [x] [] [] []   Pt will improve  hip strength to at least 5/5 to allow her to walk long distances w/o fatigue or difficulty.  [] [] [x] []   Pt will be independent and compliant with comprehensive HEP to maintain progress achieved in PT. [] [] [] [x]            Treatment Last 4 Visits  Treatment Day: 2       6/4/2025 6/16/2025   Neuro Treatment   Therapeutic Exercise HEP review  Objective re-measure   Bridges w/ marches w/ Blue TB x 10   Bridges w/ hip abd w/ BTB x 10   HEP review    Therapeutic Exercise Minutes 10 25   Total Time Of Timed Procedures 10 25   Total Time Of Service-Based Procedures 0 0   Total Treatment Time 10 25   HEP Access Code: [email or web address]  URL: [email or web address]  Date: 06/05/2025  Prepared by: Eloina Grimes    Exercises  - Lateral Step Up  - 1 x daily - 7 x weekly - 2 sets - 10 reps  - Single Leg Stance  - 1 x daily - 7 x weekly - 3 sets - 20 hold  - Hip Abduction with Resistance Loop  - 1 x daily - 7 x weekly - 2 sets - 10 reps  - Marching with Resistance  - 1 x daily - 7 x weekly - 2 sets - 10 reps Access Code: [email or web address]  URL: [email or web address]  Date: 06/16/2025  Prepared by: Eloina Grimes    Exercises  - Lateral Step Up  - 1 x daily - 7 x weekly - 2 sets - 10 reps  - Single Leg Stance  - 1 x daily - 7 x weekly - 3 sets - 20 hold  - Hip Abduction with Resistance Loop  - 1 x daily - 7 x weekly - 2 sets - 10 reps  - Marching with Resistance  - 1 x daily - 7 x weekly - 2 sets - 10 reps  - Marching Bridge  - 1 x daily - 7 x weekly - 2 sets - 10 reps  - Bridge with Hip Abduction and Resistance  - 1 x daily - 7 x weekly - 2 sets - 10 reps        HEP  Access Code: [email or web address]  URL: [email or web address]  Date: 06/16/2025  Prepared by: Eloina Grmies    Exercises  - Lateral Step Up  - 1 x daily - 7 x weekly - 2 sets - 10 reps  - Single Leg Stance  - 1  x daily - 7 x weekly - 3 sets - 20 hold  - Hip Abduction with Resistance Loop  - 1 x daily - 7 x weekly - 2 sets - 10 reps  - Marching with Resistance  - 1 x daily - 7 x weekly - 2 sets - 10 reps  - Marching Bridge  - 1 x daily - 7 x weekly - 2 sets - 10 reps  - Bridge with Hip Abduction and Resistance  - 1 x daily - 7 x weekly - 2 sets - 10 reps    Charges  x2 ther ex           Plan: Discharge with HEP.     Patient/Family/Caregiver was advised of these findings, precautions, and treatment options and has agreed to actively participate in planning and for this course of care.    Thank you for your referral. If you have any questions, please contact me at Dept: [phone].    Sincerely,  Electronically signed by therapist: Eloina Grimes PT     Physician's certification required:  Yes  Please co-sign or sign and return this letter via fax as soon as possible to [phone].   I certify the need for these services furnished under this plan of treatment and while under my care.    X___________________________________________________ Date____________________    Certification From: 6/16/2025  To:9/14/2025

## 2025-06-18 ENCOUNTER — APPOINTMENT (OUTPATIENT)
Dept: PHYSICAL THERAPY | Facility: HOSPITAL | Age: 65
End: 2025-06-18
Attending: NURSE PRACTITIONER
Payer: COMMERCIAL

## 2025-06-19 ENCOUNTER — APPOINTMENT (OUTPATIENT)
Dept: SPEECH THERAPY | Facility: HOSPITAL | Age: 65
End: 2025-06-19
Attending: NURSE PRACTITIONER
Payer: COMMERCIAL

## 2025-06-19 ENCOUNTER — APPOINTMENT (OUTPATIENT)
Dept: OCCUPATIONAL MEDICINE | Facility: HOSPITAL | Age: 65
End: 2025-06-19
Attending: NURSE PRACTITIONER
Payer: COMMERCIAL

## 2025-06-23 ENCOUNTER — APPOINTMENT (OUTPATIENT)
Dept: PHYSICAL THERAPY | Facility: HOSPITAL | Age: 65
End: 2025-06-23
Attending: NURSE PRACTITIONER
Payer: COMMERCIAL

## 2025-06-23 ENCOUNTER — APPOINTMENT (OUTPATIENT)
Dept: OCCUPATIONAL MEDICINE | Facility: HOSPITAL | Age: 65
End: 2025-06-23
Attending: NURSE PRACTITIONER
Payer: COMMERCIAL

## 2025-06-24 ENCOUNTER — TELEPHONE (OUTPATIENT)
Dept: MEDSURG UNIT | Facility: HOSPITAL | Age: 65
End: 2025-06-24

## 2025-06-24 NOTE — PROGRESS NOTES
90 DAYS STROKE FOLLOW-UP DATA COLLECTION    Patient Name: Breana Banks  Date: 6/24/2025   Date of hospital admission: 4/8/2025 Date of hospital discharge: 4/9/2025   Stroke: Ischemic  Discharge disposition: Home      Patient is s/p TNK   90-day MRS: 0

## 2025-06-25 ENCOUNTER — APPOINTMENT (OUTPATIENT)
Dept: PHYSICAL THERAPY | Facility: HOSPITAL | Age: 65
End: 2025-06-25
Attending: NURSE PRACTITIONER
Payer: COMMERCIAL

## 2025-06-26 ENCOUNTER — APPOINTMENT (OUTPATIENT)
Dept: OCCUPATIONAL MEDICINE | Facility: HOSPITAL | Age: 65
End: 2025-06-26
Attending: NURSE PRACTITIONER
Payer: COMMERCIAL

## 2025-06-26 ENCOUNTER — APPOINTMENT (OUTPATIENT)
Dept: SPEECH THERAPY | Facility: HOSPITAL | Age: 65
End: 2025-06-26
Attending: NURSE PRACTITIONER
Payer: COMMERCIAL

## 2025-06-29 ENCOUNTER — OFFICE VISIT (OUTPATIENT)
Dept: FAMILY MEDICINE CLINIC | Facility: CLINIC | Age: 65
End: 2025-06-29
Payer: COMMERCIAL

## 2025-06-29 VITALS
OXYGEN SATURATION: 98 % | SYSTOLIC BLOOD PRESSURE: 144 MMHG | TEMPERATURE: 99 F | WEIGHT: 248 LBS | DIASTOLIC BLOOD PRESSURE: 86 MMHG | HEIGHT: 68 IN | HEART RATE: 114 BPM | RESPIRATION RATE: 16 BRPM | BODY MASS INDEX: 37.59 KG/M2

## 2025-06-29 DIAGNOSIS — N30.01 ACUTE CYSTITIS WITH HEMATURIA: ICD-10-CM

## 2025-06-29 DIAGNOSIS — R39.9 UTI SYMPTOMS: Primary | ICD-10-CM

## 2025-06-29 LAB
BILIRUBIN: NEGATIVE
GLUCOSE (URINE DIPSTICK): NEGATIVE MG/DL
KETONES (URINE DIPSTICK): NEGATIVE MG/DL
MULTISTIX LOT#: ABNORMAL NUMERIC
NITRITE, URINE: NEGATIVE
PH, URINE: 8.5 (ref 4.5–8)
PROTEIN (URINE DIPSTICK): 30 MG/DL
SPECIFIC GRAVITY: 1.01 (ref 1–1.03)
URINE-COLOR: YELLOW
UROBILINOGEN,SEMI-QN: 1 MG/DL (ref 0–1.9)

## 2025-06-29 PROCEDURE — 3008F BODY MASS INDEX DOCD: CPT | Performed by: PHYSICIAN ASSISTANT

## 2025-06-29 PROCEDURE — 3079F DIAST BP 80-89 MM HG: CPT | Performed by: PHYSICIAN ASSISTANT

## 2025-06-29 PROCEDURE — 99213 OFFICE O/P EST LOW 20 MIN: CPT | Performed by: PHYSICIAN ASSISTANT

## 2025-06-29 PROCEDURE — 81003 URINALYSIS AUTO W/O SCOPE: CPT | Performed by: PHYSICIAN ASSISTANT

## 2025-06-29 PROCEDURE — 87077 CULTURE AEROBIC IDENTIFY: CPT | Performed by: PHYSICIAN ASSISTANT

## 2025-06-29 PROCEDURE — 87086 URINE CULTURE/COLONY COUNT: CPT | Performed by: PHYSICIAN ASSISTANT

## 2025-06-29 PROCEDURE — 3077F SYST BP >= 140 MM HG: CPT | Performed by: PHYSICIAN ASSISTANT

## 2025-06-29 RX ORDER — CEFADROXIL 500 MG/1
500 CAPSULE ORAL 2 TIMES DAILY
Qty: 14 CAPSULE | Refills: 0 | Status: SHIPPED | OUTPATIENT
Start: 2025-06-29 | End: 2025-07-06

## 2025-06-29 NOTE — PROGRESS NOTES
CHIEF COMPLAINT:     Chief Complaint   Patient presents with    UTI     X 3-4 days, frequency, urgency, burning, blood in urine, no otc       HPI:   Breana Banks is a 65 year old female who presents with symptoms of UTI. Complaining of urinary frequency, urgency, dysuria for the last 3-4 days.    Associated symptoms: hematuria today. Pt denies abd pain, flank pain, fever, nausea, or vomiting.  Denies vaginal discharge, itching, lesions, or rash.   Treatments tried: none.   Other  hx: several months ago  no hx of STI, pyelonephritis, or kidney stone.     Current Medications[1]   Past Medical History[2]   Social History:  Short Social Hx on File[3]      REVIEW OF SYSTEMS:   GENERAL: No fever, chills, or body aches; ok appetite  SKIN: no rashes  GI: See HPI. No N/V/C/D.   : See HPI.  EXAM:   /86   Pulse 114   Temp 99.2 °F (37.3 °C)   Resp 16   Ht 5' 8\" (1.727 m)   Wt 248 lb (112.5 kg)   SpO2 98%   BMI 37.71 kg/m²   GENERAL: well developed, well nourished,in no apparent distress  CARDIO: RRR, no murmurs  LUNGS: clear to ausculation bilaterally, no wheezing or rhonchi  GI: BS present x 4.  No hepatosplenomegaly, no tenderness in abd quads x 4  : no suprapubic tenderness; no bladder distention; no CVAT   EXTREMITIES: no edema    Recent Results (from the past 24 hours)   URINALYSIS, AUTO, W/O SCOPE    Collection Time: 06/29/25 11:26 AM   Result Value Ref Range    Glucose Urine Negative Negative mg/dL    Bilirubin Urine Negative Negative    Ketones, UA Negative Negative - Trace mg/dL    Spec Gravity 1.015 1.005 - 1.030    Blood Urine Large (A) Negative    PH Urine 8.5 (A) 5.0 - 8.0    Protein Urine 30 (A) Negative - Trace mg/dL    Urobilinogen Urine 1.0 0.2 - 1.0 mg/dL    Nitrite Urine Negative Negative    Leukocyte Esterase Urine Moderate (A) Negative    APPEARANCE cloudy Clear    Color Urine yellow Yellow    [employer] Lot# [id number] Numeric    Mult[employer] Expiration Date 3/31/26 Date         ASSESSMENT  AND PLAN:   Breana Banks is a 65 year old female presents with UTI symptoms.    ASSESSMENT:  Encounter Diagnoses   Name Primary?    UTI symptoms Yes    Acute cystitis with hematuria        PLAN: Urine dip results reviewed with pt. Meds as listed below. Risk and benefits of medication discussed.   Urine sent for culture. Pt agreed to send.    Push fluids  Comfort measures as described in Patient Instructions.    The patient is asked to f/u with PCP if no improvement in 3 days or sooner for new or worsening sx.    To seek immediate care for fever, vomiting, flank pain, or worsening symptoms.  The patient indicates understanding of these issues and agrees to the plan.    Meds & Refills for this Visit:  Requested Prescriptions     Signed Prescriptions Disp Refills    cefadroxil 500 MG Oral Cap 14 capsule 0     Sig: Take 1 capsule (500 mg total) by mouth 2 (two) times daily for 7 days.         There are no Patient Instructions on file for this visit.               [1]   Current Outpatient Medications   Medication Sig Dispense Refill    cefadroxil 500 MG Oral Cap Take 1 capsule (500 mg total) by mouth 2 (two) times daily for 7 days. 14 capsule 0    clopidogrel 75 MG Oral Tab Take 1 tablet (75 mg total) by mouth daily.      atorvastatin 40 MG Oral Tab Take 1 tablet (40 mg total) by mouth nightly.      aspirin 325 MG Oral Tab EC Take 1 tablet (325 mg total) by mouth daily.      lisinopril-hydroCHLOROthiazide 10-12.5 MG Oral Tab Take 1 tablet by mouth daily. 90 tablet 3    Omega-3 1000 MG Oral Cap Take 1,000 mg by mouth every morning.      Cholecalciferol (VITAMIN D) 1000 units Oral Tab Take 1,000 Units by mouth every morning.     [2]   Past Medical History:   Acute upper respiratory infections of unspecified site    Asymptomatic postmenopausal status (age-related) (natural)    Body piercing    CVA (cerebral vascular accident) (HCC)    Essential hypertension, benign    Localized superficial swelling, mass, or lump     Normal delivery (HCC)    Streptococcal sore throat    Undiagnosed cardiac murmurs   [3]   Social History  Socioeconomic History    Marital status:    Tobacco Use    Smoking status: Never     Passive exposure: Never    Smokeless tobacco: Never   Vaping Use    Vaping status: Never Used   Substance and Sexual Activity    Alcohol use: Yes     Alcohol/week: 2.5 standard drinks of alcohol     Types: 3 Standard drinks or equivalent per week     Comment: social    Drug use: Never   Other Topics Concern    Caffeine Concern Yes     Comment: 1 cup coffee/day    Exercise Yes     Comment: daily     Social Drivers of Health     Food Insecurity: No Food Insecurity (4/10/2025)    Received from [facility] - Food Insecurity     Worried About Running Out of Food in the Last Year: No     Ran Out of Food in the Last Year: No   Transportation Needs: No Transportation Needs (4/10/2025)    Received from [facility] - Transportation     Lack of Transportation: No   Housing Stability: Not At Risk (4/10/2025)    Received from [facility] - Housing/Utilities     Has Housing: Yes     Worried About Losing Housing: No     Unable to Get Utilities: No

## 2025-06-30 ENCOUNTER — APPOINTMENT (OUTPATIENT)
Dept: OCCUPATIONAL MEDICINE | Facility: HOSPITAL | Age: 65
End: 2025-06-30
Attending: NURSE PRACTITIONER
Payer: COMMERCIAL

## 2025-07-01 ENCOUNTER — APPOINTMENT (OUTPATIENT)
Dept: PHYSICAL THERAPY | Facility: HOSPITAL | Age: 65
End: 2025-07-01
Attending: NURSE PRACTITIONER

## 2025-07-02 ENCOUNTER — APPOINTMENT (OUTPATIENT)
Dept: OCCUPATIONAL MEDICINE | Facility: HOSPITAL | Age: 65
End: 2025-07-02
Attending: NURSE PRACTITIONER

## 2025-07-03 ENCOUNTER — APPOINTMENT (OUTPATIENT)
Dept: SPEECH THERAPY | Facility: HOSPITAL | Age: 65
End: 2025-07-03
Attending: NURSE PRACTITIONER

## 2025-07-03 ENCOUNTER — APPOINTMENT (OUTPATIENT)
Dept: PHYSICAL THERAPY | Facility: HOSPITAL | Age: 65
End: 2025-07-03
Attending: NURSE PRACTITIONER

## 2025-07-07 ENCOUNTER — APPOINTMENT (OUTPATIENT)
Dept: OCCUPATIONAL MEDICINE | Facility: HOSPITAL | Age: 65
End: 2025-07-07
Attending: NURSE PRACTITIONER

## 2025-07-07 ENCOUNTER — APPOINTMENT (OUTPATIENT)
Dept: PHYSICAL THERAPY | Facility: HOSPITAL | Age: 65
End: 2025-07-07
Attending: NURSE PRACTITIONER

## 2025-07-09 ENCOUNTER — APPOINTMENT (OUTPATIENT)
Dept: OCCUPATIONAL MEDICINE | Facility: HOSPITAL | Age: 65
End: 2025-07-09
Attending: NURSE PRACTITIONER

## 2025-07-10 ENCOUNTER — APPOINTMENT (OUTPATIENT)
Dept: SPEECH THERAPY | Facility: HOSPITAL | Age: 65
End: 2025-07-10
Attending: NURSE PRACTITIONER

## 2025-07-10 ENCOUNTER — APPOINTMENT (OUTPATIENT)
Dept: PHYSICAL THERAPY | Facility: HOSPITAL | Age: 65
End: 2025-07-10
Attending: NURSE PRACTITIONER

## 2025-07-14 ENCOUNTER — APPOINTMENT (OUTPATIENT)
Dept: OCCUPATIONAL MEDICINE | Facility: HOSPITAL | Age: 65
End: 2025-07-14
Attending: NURSE PRACTITIONER

## 2025-07-17 ENCOUNTER — APPOINTMENT (OUTPATIENT)
Dept: PHYSICAL THERAPY | Facility: HOSPITAL | Age: 65
End: 2025-07-17
Attending: NURSE PRACTITIONER

## 2025-07-17 ENCOUNTER — APPOINTMENT (OUTPATIENT)
Dept: SPEECH THERAPY | Facility: HOSPITAL | Age: 65
End: 2025-07-17
Attending: NURSE PRACTITIONER

## 2025-07-24 ENCOUNTER — APPOINTMENT (OUTPATIENT)
Dept: SPEECH THERAPY | Facility: HOSPITAL | Age: 65
End: 2025-07-24
Attending: NURSE PRACTITIONER

## 2025-07-28 ENCOUNTER — APPOINTMENT (OUTPATIENT)
Dept: SPEECH THERAPY | Facility: HOSPITAL | Age: 65
End: 2025-07-28
Attending: NURSE PRACTITIONER

## 2025-08-26 ENCOUNTER — TELEPHONE (OUTPATIENT)
Dept: SURGERY | Facility: CLINIC | Age: 65
End: 2025-08-26

## (undated) DIAGNOSIS — I10 ESSENTIAL HYPERTENSION WITH GOAL BLOOD PRESSURE LESS THAN 140/90: ICD-10-CM

## (undated) NOTE — LETTER
Date: 4/23/2025    Patient Name: Breana Banks          To Whom it may concern:    This letter has been written at the patient's request. The above patient was seen at [facility] for treatment of a medical condition.    This patient should be excused from attending work/school from 4/8/25 until further notice.        Sincerely,       MARIA TERESA Butterfield

## (undated) NOTE — LETTER
07/19/17        Gen    [address] [facility] [address]      Dear Sonia Gomez,    [address] [place] [address] records indicate that you have outstanding lab work and or testing that was ordered for you and has not yet been completed:          Vitamin D, 25-Hydroxy [E]

## (undated) NOTE — LETTER
11/16/20        [address] [place] [address]      Dear Tejas Aguila,    [address] [facility] records indicate that you have outstanding lab work and or testing that was ordered for you and has not yet been completed:  Orders Placed This Encounter        M

## (undated) NOTE — MR AVS SNAPSHOT
[address]  [address] [facility], [address] [address] [phone]               Thank you for choosing us for your health care visit with Vignesh Villanueva.   We are glad to serve you and happy to provide you with this [place] [address]   Phone:  [phone]    Diagnoses:  Screening for colon cancer   Order:  Reji Half - Internal    Patricia Prater MD   [address] [address]   Phone:  [phone]   Fax:  [phone]         Scheduling Instructions Your unique Net[employer] Access Code: [email or web address]  Expires: 3/26/2017  8:52 AM    If you have questions, you can call [phone] to talk to our [facility] Staff. Remember, NetPayment is NOT to be used for urgent needs.   For medical emergencies, dial 911

## (undated) NOTE — LETTER
01/18/19        [address]  [address] [id number]      Dear Didi Waggoner,    [address] [facility] records indicate that you have outstanding lab work and or testing that was ordered for you and has not yet been completed:  Orders Placed This Encounter      CBC

## (undated) NOTE — LETTER
10/29/19        [address]  [address] [facility] [address]      Dear Win Lal,    [address] [facility] records indicate that you have outstanding lab work and or testing that was ordered for you and has not yet been completed:  Orders Placed This Encounter      Com

## (undated) NOTE — MR AVS SNAPSHOT
[facility] [address] [facility]  [address] [facility]  [place] [id number] [phone]               Thank you for choosing us for your health care visit with VERONA Arriaga. We are glad to serve you and happy to provide you with this summary of your visit.   Lilliana you feel better. This is very important to ensure the infection is treated. It is also important to prevent drug-resistent organisms from developing. If you were given an antibiotic shot, no more antibiotics are needed.   · You may use acetaminophen (Tyleno Current Medications          This list is accurate as of: 5/15/17  8:27 AM.  Always use your most recent med list.                amoxicillin 500 MG Tabs   Take 1 tablet (500 mg total) by mouth 2 (two) times daily.    Commonly known as:  Osorio Higginbotham Fully enjoy your food when eating. Don’t eat while distracted and slow down. Avoid over sized portions. Don’t eat while when you’re bored.      EAT THESE FOODS MORE OFTEN: EAT THESE FOODS LESS OFTEN:   Make half your plate fruits and vegetables Highly

## (undated) NOTE — LETTER
Patient Name: Breana Banks  YOB: 1960          MRN number:  [id number]  Date:  6/10/2025  Referring Physician:  Perri Smith    Discharge Summary  Pt has attended 6 visits in Speech Therapy.   Today's Date   6/10/2025        Treatment Day: 6    Dear Perri Smith,  This letter is to inform you of Breana Banks's progress in speech-language therapy.    Since her initial evaluation, Breana has attended 6 sessions. Therapy sessions have targeted motor speech. A home exercise program (HEP) addressing use of compensatory speech strategies has been provided and completed consistently. During this treatment period, Breana has demonstrated improved use of compensatory strategies. As she has demonstrated significant progress and is independent with practice, it is recommended that she be discharged from speech therapy at this time.    Subjective  Breana arrived on time to session. She participated actively in therapeutic tasks and reports consistent completion of HEP.       Pain: 0/10     Objective  See goals below.   Goals (to be met in 8 visits)    Patient will independently use trained compensatory speech strategies (slow rate, over-articulation, syllable segmentation, pausing between words, breath support, etc) for production of phrases, reading, and conversation with 90% accuracy.   95% independently   Progress: Goal met.   2.   Patient will independently produce meaningful phrases with accurate articulation in 100% of opportunities.   100% independently Progress:   Goal met.        Assessment  Breana presented to speech therapy with mild dysarthria c/b slight imprecision with bilabial sounds, slightly decreased breath support, and fatigue when speaking for long periods of time. Breana has demonstrated significant progress toward goals since SOC. She is independent with use of compensatory strategies and 100% intelligible. As such, it is recommended that she be discharged from speech therapy at this  time.    Plan  Discontinue as patient has met all goals and is independent with use of strategies.    HEP  SLOB strategies, /s/, sh minimal pairs, /s/ words, phrases, sentences, paragraphs     Charges  [id number]    Total Treatment Time: 45 min    Patient/Family/Caregiver was advised of these findings, precautions, and treatment options and has agreed to actively participate in planning and for this course of care.    Thank you for your referral. If you have any questions, please contact me at Dept: [phone].    Sincerely,  Electronically signed by therapist: ALY Gong     21st [employer] Cures Act Notice to Patient: Medical documents like this are made available to patients in the interest of transparency. However, be advised this is a medical document and it is intended as [id number] communication between your medical providers. This medical document may contain abbreviations, assessments, medical data, and results or other terms that are unfamiliar. Medical documents are intended to carry relevant information, facts as evident, and the clinical opinion of the practitioner. As such, this medical document may be written in language that appears blunt or direct. You are encouraged to contact your medical provider and/or [facility] Patient Experience if you have any questions about this medical document.